# Patient Record
Sex: MALE | Race: WHITE | Employment: FULL TIME | URBAN - METROPOLITAN AREA
[De-identification: names, ages, dates, MRNs, and addresses within clinical notes are randomized per-mention and may not be internally consistent; named-entity substitution may affect disease eponyms.]

---

## 2019-09-13 ENCOUNTER — HOSPITAL ENCOUNTER (EMERGENCY)
Facility: CLINIC | Age: 29
Discharge: HOME | End: 2019-09-13
Attending: STUDENT IN AN ORGANIZED HEALTH CARE EDUCATION/TRAINING PROGRAM | Admitting: STUDENT IN AN ORGANIZED HEALTH CARE EDUCATION/TRAINING PROGRAM
Payer: COMMERCIAL

## 2019-09-13 VITALS
SYSTOLIC BLOOD PRESSURE: 140 MMHG | HEART RATE: 79 BPM | RESPIRATION RATE: 18 BRPM | OXYGEN SATURATION: 100 % | DIASTOLIC BLOOD PRESSURE: 93 MMHG | TEMPERATURE: 98.2 F

## 2019-09-13 DIAGNOSIS — K08.89 PAIN, DENTAL: ICD-10-CM

## 2019-09-13 PROBLEM — F12.20 CANNABIS USE DISORDER, SEVERE, DEPENDENCE (H): Status: ACTIVE | Noted: 2019-06-29

## 2019-09-13 PROBLEM — F11.20: Status: ACTIVE | Noted: 2019-06-26

## 2019-09-13 PROBLEM — F32.5 MAJOR DEPRESSION IN COMPLETE REMISSION (H): Status: ACTIVE | Noted: 2019-06-26

## 2019-09-13 PROCEDURE — 25000132 ZZH RX MED GY IP 250 OP 250 PS 637: Performed by: STUDENT IN AN ORGANIZED HEALTH CARE EDUCATION/TRAINING PROGRAM

## 2019-09-13 PROCEDURE — 99283 EMERGENCY DEPT VISIT LOW MDM: CPT | Performed by: STUDENT IN AN ORGANIZED HEALTH CARE EDUCATION/TRAINING PROGRAM

## 2019-09-13 PROCEDURE — 99284 EMERGENCY DEPT VISIT MOD MDM: CPT | Mod: Z6 | Performed by: STUDENT IN AN ORGANIZED HEALTH CARE EDUCATION/TRAINING PROGRAM

## 2019-09-13 RX ORDER — PENICILLIN V POTASSIUM 250 MG/1
500 TABLET, FILM COATED ORAL ONCE
Status: COMPLETED | OUTPATIENT
Start: 2019-09-13 | End: 2019-09-13

## 2019-09-13 RX ORDER — PENICILLIN V POTASSIUM 500 MG/1
500 TABLET, FILM COATED ORAL 4 TIMES DAILY
Qty: 40 TABLET | Refills: 0 | Status: SHIPPED | OUTPATIENT
Start: 2019-09-13 | End: 2019-09-23

## 2019-09-13 RX ADMIN — PENICILLIN V POTASSIUM 500 MG: 250 TABLET, FILM COATED ORAL at 04:44

## 2019-09-13 NOTE — DISCHARGE INSTRUCTIONS
Many of these clinics offer a sliding fee option for patients that qualify, and see patients on a walk-in or same day basis. Please call each clinic directly. As services, hours, fees and policies vary greatly.          [facility], [facility]  [phone]  Sees no insurance  [facility], [place]  [phone]  Preventive services only  [facility] (mult loc) [phone]  [facility]    ([facility]), [facility]  [phone]  [facility], [place]       [phone]  Preventive services only  [facility]  [phone]  Accepts MA & sees no ins  [facility],      Accepts MA & sees no ins   [place]   [phone]; [phone]  [facility], [facility]   Accepts MA & sees no ins       [phone]  [facility], [facility]  [phone]   Accepts MA emergencies  [facility], [place] [phone]  [facility],     Accepts MA   [place]   [phone]    [facility] [phone]  Accepts MA & sees no ins   [facility]   Dental Clinic    [phone]  [facility], [facility]  [phone]   [facility] [phone]  [facility]  Preventive services only   [place]   [phone]  [facility] [phone]  [facility], [place]  [phone]  Same day [facility] [phone]  Same day [facility] [[email or web address]],      Same day [facility]   [phone]    [facility], [facility] [phone]  Free [facility], walk-in only  [facility] (multiple locations) [phone]      [facility] , [facility] [phone]    [facility] [phone]  Free clinic, walk-in only  [facility]  [phone]  [facility] [phone] (adults)       [phone]  (children)  [facility] [phone]    Also, referral service for low cost dental and healthcare: [phone]  And [phone]

## 2019-09-13 NOTE — ED PROVIDER NOTES
History     Chief Complaint   Patient presents with     Dental Problem     HPI  Denilson Colunga is a 29 year old male who presents for evaluation of left mandibular dental pain and swelling.  Patient states that over the past 24 hours he has felt pain around the mandibular left first premolar which is significantly decayed.  He also feels as though the left side of his mandible is beginning to swell.  He denies palpable abscess, throat swelling, or difficulty swallowing.  He believes he lost saw a dentist 2 years ago, is insured and plans to schedule an appointment later this morning.  He denies fever or other infectious symptoms.  Has been taking OTC ibuprofen as directed with relief from pain, describes as tolerable.    Allergies:  No Known Allergies    Problem List:    Patient Active Problem List    Diagnosis Date Noted     Cannabis use disorder, severe, dependence (H) 06/29/2019     Priority: Medium     Heroin use disorder, severe (H) 06/26/2019     Priority: Medium     Major depression in complete remission (H) 06/26/2019     Priority: Medium     Acne 04/28/2011     Priority: Medium     Generalized anxiety disorder 04/28/2011     Priority: Medium     Patient currently not taking medications . Kyle 7  Is 7 today.       CARDIOVASCULAR SCREENING; LDL GOAL LESS THAN 160 10/31/2010     Priority: Medium        Past Medical History:    Past Medical History:   Diagnosis Date     Anxiety        Past Surgical History:    Past Surgical History:   Procedure Laterality Date     NO HISTORY OF SURGERY         Family History:    Family History   Problem Relation Age of Onset     Depression Mother      Neurologic Disorder Mother         migraine     Thyroid Disease Father      Neurologic Disorder Maternal Grandmother         migraine     Depression Maternal Grandmother      Diabetes Maternal Grandmother      Hypertension Maternal Grandmother      Heart Disease Maternal Grandfather      Heart Disease Paternal Grandmother       Cerebrovascular Disease Paternal Grandmother      Alzheimer Disease Paternal Grandfather      Neurologic Disorder Sister         migraine       Social History:  Marital Status:  Single [1]  Social History     Tobacco Use     Smoking status: Current Every Day Smoker     Packs/day: 0.50     Types: Cigarettes     Last attempt to quit: 2010     Years since quittin.3     Smokeless tobacco: Never Used   Substance Use Topics     Alcohol use: Yes     Comment: socially     Drug use: Yes     Types: Marijuana     Comment: marijuana        Medications:      penicillin V (VEETID) 500 MG tablet   clindamycin-benzoyl Per, Refr, (DUAC) 1.2-5 % GEL   Sulfacetamide Sodium-Sulfur (SULFACETAMIDE SOD-SULFUR WASH) 10-5 % EMUL         Review of Systems  Constitutional:  Negative for fever or chills.  HENT: Positive for left mandibular dental pain.  Respiratory:  Negative for cough or shortness of breath.  Musculoskeletal: Negative for neck pain or stiffness.  Neurological:  Negative for headache.    All others reviewed and are negative.      Physical Exam   BP: (!) 140/93  Pulse: 79  Temp: 98.2  F (36.8  C)  Resp: 18  SpO2: 100 %      Physical Exam  Constitutional:  Well developed, well nourished.  Appears nontoxic and in no acute distress.   HENT:  Normocephalic and atraumatic.  Eyes:  Conjunctivae are normal.  Oral:  Patient is without trismus.  Moist oral mucosa.  Significant dental decay of multiple teeth including left mandibular first premolar which is tender to palpation.  Gingival lining intact without abscess or ulcerative gingivitis.  No pharyngeal erythema or exudate.  No uvular asymmetry.  Patient is able to elevate tongue without sublingual swelling.  Voice is not muffled.  Tolerates secretions.  Neck:  Neck supple.  Cardiovascular:  No cyanosis.   Respiratory:  Effort normal, no respiratory distress.   Musculoskeletal:  Moves extremities spontaneously.  Neurological:  Patient is alert.  Skin:  Skin is warm and  dry.  Psychiatric:  Normal mood and affect.      ED Course        Procedures               Critical Care time:  none               No results found for this or any previous visit (from the past 24 hour(s)).    Medications   penicillin V (VEETID) tablet 500 mg (has no administration in time range)       Assessments & Plan (with Medical Decision Making)   Denilson Colunga is a 29 year old male who presents to the department for evaluation of left mandibular dental pain and early left mandibular swelling.  There is no drainable abscess.  No sign of ANUG, peritonsillar abscess, Ascencion's angina, osteomyelitis, orofacial or peripharyngeal deep space infection.  Suspect early periapical abscess, he has tolerated oral penicillin in the past for her symptoms.  Recommend follow-up with dental clinic over the next 2-3 days for evaluation and definitive management plan.  He has declined analgesic medication stating ibuprofen is sufficient for now.        Disclaimer:  This note consists of symbols derived from keyboarding, dictation, and/or voice recognition software.  As a result, there may be errors in the script that have gone undetected.  Please consider this when interpreting information found in the chart.        I have reviewed the nursing notes.    I have reviewed the findings, diagnosis, plan and need for follow up with the patient.       New Prescriptions    PENICILLIN V (VEETID) 500 MG TABLET    Take 1 tablet (500 mg) by mouth 4 times daily for 10 days       Final diagnoses:   Pain, dental       9/13/2019   [facility] EMERGENCY DEPARTMENT     Lyle Perkins,   09/13/19 0437

## 2019-09-13 NOTE — ED NOTES
Patient here for antibiotics for infected left lower molar infection.  No pain. No facial swelling noted.  Stated can feel infection coming on and wanted to be started on antibiotics.  Feels like last tooth infection.  No other complaints  Started 2 days ago

## 2019-09-13 NOTE — ED AVS SNAPSHOT
[facility] Emergency Department  [address] [address]  Phone:  [phone]  Fax:  [phone]                                    Denilson Colunga   MRN: [id number]    Department:  [facility] Emergency Department   Date of Visit:  9/13/2019           After Visit Summary Signature Page    I have received my discharge instructions, and my questions have been answered. I have discussed any challenges I see with this plan with the nurse or doctor.    ..........................................................................................................................................  Patient/Patient Representative Signature      ..........................................................................................................................................  Patient Representative Print Name and Relationship to Patient    ..................................................               ................................................  Date                                   Time    ..........................................................................................................................................  Reviewed by Signature/Title    ...................................................              ..............................................  Date                                               Time          22EPIC Rev 08/18

## 2020-06-29 ENCOUNTER — HOSPITAL ENCOUNTER (EMERGENCY)
Facility: CLINIC | Age: 30
Discharge: HOME | End: 2020-06-29
Attending: EMERGENCY MEDICINE | Admitting: EMERGENCY MEDICINE
Payer: COMMERCIAL

## 2020-06-29 ENCOUNTER — APPOINTMENT (OUTPATIENT)
Dept: GENERAL RADIOLOGY | Facility: CLINIC | Age: 30
End: 2020-06-29
Attending: EMERGENCY MEDICINE
Payer: COMMERCIAL

## 2020-06-29 VITALS
DIASTOLIC BLOOD PRESSURE: 57 MMHG | HEART RATE: 111 BPM | RESPIRATION RATE: 18 BRPM | TEMPERATURE: 98.6 F | SYSTOLIC BLOOD PRESSURE: 128 MMHG | OXYGEN SATURATION: 99 %

## 2020-06-29 DIAGNOSIS — S60.511A ABRASION OF RIGHT HAND, INITIAL ENCOUNTER: ICD-10-CM
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
## 2020-06-29 DIAGNOSIS — S20.219A CONTUSION OF CHEST WALL, UNSPECIFIED LATERALITY, INITIAL ENCOUNTER: ICD-10-CM

## 2020-06-29 PROCEDURE — 25000128 H RX IP 250 OP 636: Performed by: EMERGENCY MEDICINE

## 2020-06-29 PROCEDURE — 90471 IMMUNIZATION ADMIN: CPT | Performed by: EMERGENCY MEDICINE

## 2020-06-29 PROCEDURE — 90715 TDAP VACCINE 7 YRS/> IM: CPT | Performed by: EMERGENCY MEDICINE

## 2020-06-29 PROCEDURE — 73110 X-RAY EXAM OF WRIST: CPT | Mod: RT

## 2020-06-29 PROCEDURE — 99284 EMERGENCY DEPT VISIT MOD MDM: CPT | Mod: 25 | Performed by: EMERGENCY MEDICINE

## 2020-06-29 PROCEDURE — 99283 EMERGENCY DEPT VISIT LOW MDM: CPT | Mod: Z6 | Performed by: EMERGENCY MEDICINE

## 2020-06-29 PROCEDURE — 71046 X-RAY EXAM CHEST 2 VIEWS: CPT

## 2020-06-29 PROCEDURE — 25000132 ZZH RX MED GY IP 250 OP 250 PS 637: Performed by: EMERGENCY MEDICINE

## 2020-06-29 RX ORDER — IBUPROFEN 400 MG/1
800 TABLET, FILM COATED ORAL ONCE
Status: COMPLETED | OUTPATIENT
Start: 2020-06-29 | End: 2020-06-29

## 2020-06-29 RX ORDER — ACETAMINOPHEN 500 MG
1000 TABLET ORAL ONCE
Status: COMPLETED | OUTPATIENT
Start: 2020-06-29 | End: 2020-06-29

## 2020-06-29 RX ADMIN — ACETAMINOPHEN 1000 MG: 500 TABLET, FILM COATED ORAL at 20:58

## 2020-06-29 RX ADMIN — IBUPROFEN 800 MG: 400 TABLET ORAL at 20:58

## 2020-06-29 RX ADMIN — CLOSTRIDIUM TETANI TOXOID ANTIGEN (FORMALDEHYDE INACTIVATED), CORYNEBACTERIUM DIPHTHERIAE TOXOID ANTIGEN (FORMALDEHYDE INACTIVATED), BORDETELLA PERTUSSIS TOXOID ANTIGEN (GLUTARALDEHYDE INACTIVATED), BORDETELLA PERTUSSIS FILAMENTOUS HEMAGGLUTININ ANTIGEN (FORMALDEHYDE INACTIVATED), BORDETELLA PERTUSSIS PERTACTIN ANTIGEN, AND BORDETELLA PERTUSSIS FIMBRIAE 2/3 ANTIGEN 0.5 ML: 5; 2; 2.5; 5; 3; 5 INJECTION, SUSPENSION INTRAMUSCULAR at 20:58

## 2020-06-29 NOTE — ED AVS SNAPSHOT
[facility] Emergency Department  [address] [address]  Phone:  [phone]  Fax:  [phone]                                    Denilson Colunga   MRN: [id number]    Department:  [facility] Emergency Department   Date of Visit:  6/29/2020           After Visit Summary Signature Page    I have received my discharge instructions, and my questions have been answered. I have discussed any challenges I see with this plan with the nurse or doctor.    ..........................................................................................................................................  Patient/Patient Representative Signature      ..........................................................................................................................................  Patient Representative Print Name and Relationship to Patient    ..................................................               ................................................  Date                                   Time    ..........................................................................................................................................  Reviewed by Signature/Title    ...................................................              ..............................................  Date                                               Time          22EPIC Rev 08/18

## 2020-06-30 NOTE — ED NOTES
Patient attempted to call multiple people to get a ride home.  Writer asked patient several times if we could call his mom to get him a ride home.  Patient stated that his mom has cataracts, a 5 year old at home, would be sleeping, and recently had COVID.  Patient refused to allow writer to call his mother.  Writer asked patient if we could call him a cab and patient stated that he didn't have any money.  Patient discharged and sitting in lobby attempting to call for a ride.
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
## 2020-06-30 NOTE — DISCHARGE INSTRUCTIONS
Tylenol and ibuprofen for discomfort    Ice to swollen painful areas    Return here for worsening chest pain, trouble breathing, passing out or any other concern
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
## 2021-10-19 PROBLEM — F32.9 MAJOR DEPRESSION: Status: ACTIVE | Noted: 2019-06-26

## 2022-05-04 ENCOUNTER — LAB REQUISITION (OUTPATIENT)
Dept: LAB | Facility: CLINIC | Age: 32
End: 2022-05-04
Payer: COMMERCIAL

## 2022-05-04 DIAGNOSIS — R19.7 DIARRHEA, UNSPECIFIED: ICD-10-CM

## 2022-05-04 LAB
ALBUMIN SERPL-MCNC: 4.6 G/DL (ref 3.5–5)
ALP SERPL-CCNC: 79 U/L (ref 45–120)
ALT SERPL W P-5'-P-CCNC: 13 U/L (ref 0–45)
ANION GAP SERPL CALCULATED.3IONS-SCNC: 15 MMOL/L (ref 5–18)
AST SERPL W P-5'-P-CCNC: 16 U/L (ref 0–40)
BILIRUB SERPL-MCNC: 0.4 MG/DL (ref 0–1)
BUN SERPL-MCNC: 15 MG/DL (ref 8–22)
CALCIUM SERPL-MCNC: 9.7 MG/DL (ref 8.5–10.5)
CHLORIDE BLD-SCNC: 102 MMOL/L (ref 98–107)
CO2 SERPL-SCNC: 24 MMOL/L (ref 22–31)
CREAT SERPL-MCNC: 0.81 MG/DL (ref 0.7–1.3)
GFR SERPL CREATININE-BSD FRML MDRD: >90 ML/MIN/1.73M2
GLUCOSE BLD-MCNC: 126 MG/DL (ref 70–125)
POTASSIUM BLD-SCNC: 4.4 MMOL/L (ref 3.5–5)
PROT SERPL-MCNC: 7.1 G/DL (ref 6–8)
SODIUM SERPL-SCNC: 141 MMOL/L (ref 136–145)

## 2022-05-04 PROCEDURE — 80053 COMPREHEN METABOLIC PANEL: CPT | Mod: ORL | Performed by: PHYSICIAN ASSISTANT

## 2022-10-03 ENCOUNTER — TELEPHONE (OUTPATIENT)
Dept: BEHAVIORAL HEALTH | Facility: CLINIC | Age: 32
End: 2022-10-03

## 2022-10-03 NOTE — TELEPHONE ENCOUNTER
Pt is a(n) 32 year old seeking an Eval for ASHLEY/CD  Pt interested in Evaluation and suggestions   Appointment Scheduled by: Mother, Clarisse Prasad

## 2022-10-06 ENCOUNTER — TELEPHONE (OUTPATIENT)
Dept: BEHAVIORAL HEALTH | Facility: CLINIC | Age: 32
End: 2022-10-06

## 2022-10-06 NOTE — TELEPHONE ENCOUNTER
Patient have a video appointment today at 10:30am. Writer placed a call this morning to check in patient. Unable to get a hold of patient. Writer left a voicemail with writer's call back number.

## 2023-06-23 ENCOUNTER — HOSPITAL ENCOUNTER (OUTPATIENT)
Facility: CLINIC | Age: 33
Setting detail: OBSERVATION
Discharge: NURSING, REHAB OR LONG-TERM CARE | End: 2023-06-27
Attending: EMERGENCY MEDICINE | Admitting: EMERGENCY MEDICINE

## 2023-06-23 DIAGNOSIS — F14.90 COCAINE USE: ICD-10-CM

## 2023-06-23 DIAGNOSIS — F13.90 SEDATIVE, HYPNOTIC, OR ANXIOLYTIC USE, UNSPECIFIED, UNCOMPLICATED: ICD-10-CM

## 2023-06-23 DIAGNOSIS — F10.90 ALCOHOL USE, UNSPECIFIED, UNCOMPLICATED: Primary | ICD-10-CM

## 2023-06-23 DIAGNOSIS — F33.9 EPISODE OF RECURRENT MAJOR DEPRESSIVE DISORDER, UNSPECIFIED DEPRESSION EPISODE SEVERITY (H): ICD-10-CM

## 2023-06-23 DIAGNOSIS — F41.1 GAD (GENERALIZED ANXIETY DISORDER): ICD-10-CM

## 2023-06-23 LAB
ALBUMIN SERPL BCG-MCNC: 4.3 G/DL (ref 3.5–5.2)
ALP SERPL-CCNC: 133 U/L (ref 40–129)
ALT SERPL W P-5'-P-CCNC: 48 U/L (ref 0–70)
AMPHETAMINES UR QL SCN: ABNORMAL
ANION GAP SERPL CALCULATED.3IONS-SCNC: 12 MMOL/L (ref 7–15)
AST SERPL W P-5'-P-CCNC: 49 U/L (ref 0–45)
BARBITURATES UR QL SCN: ABNORMAL
BASOPHILS # BLD AUTO: 0.1 10E3/UL (ref 0–0.2)
BASOPHILS NFR BLD AUTO: 1 %
BENZODIAZ UR QL SCN: ABNORMAL
BILIRUB SERPL-MCNC: 0.4 MG/DL
BUN SERPL-MCNC: 25.4 MG/DL (ref 6–20)
BZE UR QL SCN: ABNORMAL
CALCIUM SERPL-MCNC: 8.8 MG/DL (ref 8.6–10)
CANNABINOIDS UR QL SCN: ABNORMAL
CHLORIDE SERPL-SCNC: 99 MMOL/L (ref 98–107)
CREAT SERPL-MCNC: 1.13 MG/DL (ref 0.67–1.17)
DEPRECATED HCO3 PLAS-SCNC: 24 MMOL/L (ref 22–29)
EOSINOPHIL # BLD AUTO: 0.4 10E3/UL (ref 0–0.7)
EOSINOPHIL NFR BLD AUTO: 6 %
ERYTHROCYTE [DISTWIDTH] IN BLOOD BY AUTOMATED COUNT: 13.2 % (ref 10–15)
GFR SERPL CREATININE-BSD FRML MDRD: 89 ML/MIN/1.73M2
GLUCOSE SERPL-MCNC: 103 MG/DL (ref 70–99)
HCT VFR BLD AUTO: 36.9 % (ref 40–53)
HGB BLD-MCNC: 12.3 G/DL (ref 13.3–17.7)
IMM GRANULOCYTES # BLD: 0 10E3/UL
IMM GRANULOCYTES NFR BLD: 1 %
LYMPHOCYTES # BLD AUTO: 1.6 10E3/UL (ref 0.8–5.3)
LYMPHOCYTES NFR BLD AUTO: 23 %
MCH RBC QN AUTO: 28.5 PG (ref 26.5–33)
MCHC RBC AUTO-ENTMCNC: 33.3 G/DL (ref 31.5–36.5)
MCV RBC AUTO: 85 FL (ref 78–100)
MONOCYTES # BLD AUTO: 0.8 10E3/UL (ref 0–1.3)
MONOCYTES NFR BLD AUTO: 11 %
NEUTROPHILS # BLD AUTO: 4 10E3/UL (ref 1.6–8.3)
NEUTROPHILS NFR BLD AUTO: 58 %
NRBC # BLD AUTO: 0 10E3/UL
NRBC BLD AUTO-RTO: 0 /100
OPIATES UR QL SCN: ABNORMAL
PLATELET # BLD AUTO: 347 10E3/UL (ref 150–450)
POTASSIUM SERPL-SCNC: 3.9 MMOL/L (ref 3.4–5.3)
PROT SERPL-MCNC: 7 G/DL (ref 6.4–8.3)
RBC # BLD AUTO: 4.32 10E6/UL (ref 4.4–5.9)
SODIUM SERPL-SCNC: 135 MMOL/L (ref 136–145)
WBC # BLD AUTO: 6.9 10E3/UL (ref 4–11)

## 2023-06-23 PROCEDURE — G0378 HOSPITAL OBSERVATION PER HR: HCPCS

## 2023-06-23 PROCEDURE — 90791 PSYCH DIAGNOSTIC EVALUATION: CPT

## 2023-06-23 PROCEDURE — 36415 COLL VENOUS BLD VENIPUNCTURE: CPT | Performed by: EMERGENCY MEDICINE

## 2023-06-23 PROCEDURE — 80053 COMPREHEN METABOLIC PANEL: CPT | Performed by: EMERGENCY MEDICINE

## 2023-06-23 PROCEDURE — 250N000013 HC RX MED GY IP 250 OP 250 PS 637: Performed by: EMERGENCY MEDICINE

## 2023-06-23 PROCEDURE — 99285 EMERGENCY DEPT VISIT HI MDM: CPT | Mod: 25 | Performed by: EMERGENCY MEDICINE

## 2023-06-23 PROCEDURE — 99223 1ST HOSP IP/OBS HIGH 75: CPT | Performed by: EMERGENCY MEDICINE

## 2023-06-23 PROCEDURE — 80307 DRUG TEST PRSMV CHEM ANLYZR: CPT | Performed by: EMERGENCY MEDICINE

## 2023-06-23 PROCEDURE — 85025 COMPLETE CBC W/AUTO DIFF WBC: CPT | Performed by: EMERGENCY MEDICINE

## 2023-06-23 RX ORDER — BUPRENORPHINE AND NALOXONE 8; 2 MG/1; MG/1
1 FILM, SOLUBLE BUCCAL; SUBLINGUAL 2 TIMES DAILY
Status: DISCONTINUED | OUTPATIENT
Start: 2023-06-23 | End: 2023-06-24

## 2023-06-23 RX ORDER — BUPRENORPHINE AND NALOXONE 4; 1 MG/1; MG/1
1 FILM, SOLUBLE BUCCAL; SUBLINGUAL DAILY
Status: DISCONTINUED | OUTPATIENT
Start: 2023-06-24 | End: 2023-06-24

## 2023-06-23 RX ORDER — OLANZAPINE 5 MG/1
5 TABLET, ORALLY DISINTEGRATING ORAL ONCE
Status: COMPLETED | OUTPATIENT
Start: 2023-06-23 | End: 2023-06-23

## 2023-06-23 RX ORDER — IBUPROFEN 600 MG/1
600 TABLET, FILM COATED ORAL ONCE
Status: COMPLETED | OUTPATIENT
Start: 2023-06-23 | End: 2023-06-23

## 2023-06-23 RX ADMIN — OLANZAPINE 5 MG: 5 TABLET, ORALLY DISINTEGRATING ORAL at 19:35

## 2023-06-23 RX ADMIN — IBUPROFEN 600 MG: 600 TABLET, FILM COATED ORAL at 20:50

## 2023-06-23 ASSESSMENT — COLUMBIA-SUICIDE SEVERITY RATING SCALE - C-SSRS
4. HAVE YOU HAD THESE THOUGHTS AND HAD SOME INTENTION OF ACTING ON THEM?: NO
6. HAVE YOU EVER DONE ANYTHING, STARTED TO DO ANYTHING, OR PREPARED TO DO ANYTHING TO END YOUR LIFE?: NO
3. HAVE YOU BEEN THINKING ABOUT HOW YOU MIGHT KILL YOURSELF?: NO
2. HAVE YOU ACTUALLY HAD ANY THOUGHTS OF KILLING YOURSELF?: YES
5. HAVE YOU STARTED TO WORK OUT OR WORKED OUT THE DETAILS OF HOW TO KILL YOURSELF? DO YOU INTEND TO CARRY OUT THIS PLAN?: NO
REASONS FOR IDEATION LIFETIME: MOSTLY TO END OR STOP THE PAIN (YOU COULDN'T GO ON LIVING WITH THE PAIN OR HOW YOU WERE FEELING)
5. HAVE YOU STARTED TO WORK OUT OR WORKED OUT THE DETAILS OF HOW TO KILL YOURSELF? DO YOU INTEND TO CARRY OUT THIS PLAN?: NO
ATTEMPT LIFETIME: NO
4. HAVE YOU HAD THESE THOUGHTS AND HAD SOME INTENTION OF ACTING ON THEM?: NO
TOTAL  NUMBER OF ABORTED OR SELF INTERRUPTED ATTEMPTS LIFETIME: NO
TOTAL  NUMBER OF INTERRUPTED ATTEMPTS LIFETIME: NO
1. HAVE YOU WISHED YOU WERE DEAD OR WISHED YOU COULD GO TO SLEEP AND NOT WAKE UP?: YES
REASONS FOR IDEATION PAST MONTH: MOSTLY TO END OR STOP THE PAIN (YOU COULDN'T GO ON LIVING WITH THE PAIN OR HOW YOU WERE FEELING)
2. HAVE YOU ACTUALLY HAD ANY THOUGHTS OF KILLING YOURSELF?: YES
1. IN THE PAST MONTH, HAVE YOU WISHED YOU WERE DEAD OR WISHED YOU COULD GO TO SLEEP AND NOT WAKE UP?: YES

## 2023-06-23 ASSESSMENT — ACTIVITIES OF DAILY LIVING (ADL)
ADLS_ACUITY_SCORE: 35
ADLS_ACUITY_SCORE: 33
ADLS_ACUITY_SCORE: 35

## 2023-06-23 NOTE — ED TRIAGE NOTES
Triage Assessment     Row Name 06/23/23 7402       Triage Assessment (Adult)    Airway WDL WDL       Respiratory WDL    Respiratory WDL WDL       Skin Circulation/Temperature WDL    Skin Circulation/Temperature WDL X  abrasions to bilateral knees       Cardiac WDL    Cardiac WDL WDL       Peripheral/Neurovascular WDL    Peripheral Neurovascular WDL WDL       Cognitive/Neuro/Behavioral WDL    Cognitive/Neuro/Behavioral WDL WDL

## 2023-06-24 PROCEDURE — 99232 SBSQ HOSP IP/OBS MODERATE 35: CPT | Performed by: EMERGENCY MEDICINE

## 2023-06-24 PROCEDURE — 250N000013 HC RX MED GY IP 250 OP 250 PS 637: Performed by: EMERGENCY MEDICINE

## 2023-06-24 PROCEDURE — G0378 HOSPITAL OBSERVATION PER HR: HCPCS

## 2023-06-24 RX ORDER — MIRTAZAPINE 15 MG/1
15 TABLET, FILM COATED ORAL AT BEDTIME
Status: ON HOLD | COMMUNITY
End: 2023-11-04

## 2023-06-24 RX ORDER — VENLAFAXINE HYDROCHLORIDE 150 MG/1
300 CAPSULE, EXTENDED RELEASE ORAL DAILY
Status: DISCONTINUED | OUTPATIENT
Start: 2023-06-24 | End: 2023-06-27 | Stop reason: HOSPADM

## 2023-06-24 RX ORDER — BUPRENORPHINE AND NALOXONE 4; 1 MG/1; MG/1
1 FILM, SOLUBLE BUCCAL; SUBLINGUAL DAILY
Status: DISCONTINUED | OUTPATIENT
Start: 2023-06-24 | End: 2023-06-24 | Stop reason: DRUGHIGH

## 2023-06-24 RX ORDER — GABAPENTIN 600 MG/1
600 TABLET ORAL 3 TIMES DAILY
COMMUNITY
End: 2023-11-02

## 2023-06-24 RX ORDER — VENLAFAXINE HYDROCHLORIDE 150 MG/1
2 CAPSULE, EXTENDED RELEASE ORAL DAILY
COMMUNITY

## 2023-06-24 RX ORDER — MIRTAZAPINE 15 MG/1
15 TABLET, FILM COATED ORAL AT BEDTIME
Status: DISCONTINUED | OUTPATIENT
Start: 2023-06-24 | End: 2023-06-27 | Stop reason: HOSPADM

## 2023-06-24 RX ORDER — BUSPIRONE HYDROCHLORIDE 15 MG/1
15 TABLET ORAL 2 TIMES DAILY
Status: DISCONTINUED | OUTPATIENT
Start: 2023-06-24 | End: 2023-06-27 | Stop reason: HOSPADM

## 2023-06-24 RX ORDER — BUSPIRONE HYDROCHLORIDE 30 MG/1
1 TABLET ORAL 2 TIMES DAILY
COMMUNITY
End: 2023-12-08

## 2023-06-24 RX ORDER — BUPRENORPHINE AND NALOXONE 8; 2 MG/1; MG/1
1 FILM, SOLUBLE BUCCAL; SUBLINGUAL DAILY
Status: DISCONTINUED | OUTPATIENT
Start: 2023-06-25 | End: 2023-06-27 | Stop reason: HOSPADM

## 2023-06-24 RX ORDER — HYDROXYZINE HYDROCHLORIDE 25 MG/1
50 TABLET, FILM COATED ORAL 3 TIMES DAILY PRN
COMMUNITY
End: 2023-11-02

## 2023-06-24 RX ORDER — BUPRENORPHINE AND NALOXONE 4; 1 MG/1; MG/1
1 FILM, SOLUBLE BUCCAL; SUBLINGUAL 2 TIMES DAILY
Status: DISCONTINUED | OUTPATIENT
Start: 2023-06-24 | End: 2023-06-27 | Stop reason: HOSPADM

## 2023-06-24 RX ORDER — BUPRENORPHINE AND NALOXONE 8; 2 MG/1; MG/1
FILM, SOLUBLE BUCCAL; SUBLINGUAL
COMMUNITY

## 2023-06-24 RX ORDER — IBUPROFEN 200 MG
400 TABLET ORAL EVERY 6 HOURS PRN
Status: DISCONTINUED | OUTPATIENT
Start: 2023-06-24 | End: 2023-06-27 | Stop reason: HOSPADM

## 2023-06-24 RX ORDER — IBUPROFEN 400 MG/1
400 TABLET, FILM COATED ORAL EVERY 6 HOURS PRN
COMMUNITY
End: 2023-11-02

## 2023-06-24 RX ORDER — HYDROXYZINE HYDROCHLORIDE 50 MG/1
50 TABLET, FILM COATED ORAL 3 TIMES DAILY PRN
Status: DISCONTINUED | OUTPATIENT
Start: 2023-06-24 | End: 2023-06-27 | Stop reason: HOSPADM

## 2023-06-24 RX ORDER — IBUPROFEN 600 MG/1
600 TABLET, FILM COATED ORAL ONCE
Status: COMPLETED | OUTPATIENT
Start: 2023-06-24 | End: 2023-06-24

## 2023-06-24 RX ADMIN — IBUPROFEN 600 MG: 600 TABLET, FILM COATED ORAL at 14:36

## 2023-06-24 RX ADMIN — BUPRENORPHINE AND NALOXONE 1 FILM: 4; 1 FILM, SOLUBLE BUCCAL; SUBLINGUAL at 13:58

## 2023-06-24 RX ADMIN — MIRTAZAPINE 15 MG: 15 TABLET, FILM COATED ORAL at 22:10

## 2023-06-24 RX ADMIN — IBUPROFEN 400 MG: 200 TABLET, FILM COATED ORAL at 22:09

## 2023-06-24 RX ADMIN — VENLAFAXINE HYDROCHLORIDE 300 MG: 150 CAPSULE, EXTENDED RELEASE ORAL at 18:52

## 2023-06-24 RX ADMIN — BUPRENORPHINE AND NALOXONE 1 FILM: 4; 1 FILM, SOLUBLE BUCCAL; SUBLINGUAL at 22:09

## 2023-06-24 RX ADMIN — BUSPIRONE HYDROCHLORIDE 15 MG: 15 TABLET ORAL at 20:30

## 2023-06-24 RX ADMIN — NICOTINE POLACRILEX 4 MG: 4 GUM, CHEWING BUCCAL at 20:13

## 2023-06-24 RX ADMIN — HYDROXYZINE HYDROCHLORIDE 50 MG: 25 TABLET, FILM COATED ORAL at 22:10

## 2023-06-24 RX ADMIN — BUPRENORPHINE AND NALOXONE 1 FILM: 8; 2 FILM, SOLUBLE BUCCAL; SUBLINGUAL at 00:06

## 2023-06-24 ASSESSMENT — ACTIVITIES OF DAILY LIVING (ADL)
ADLS_ACUITY_SCORE: 35

## 2023-06-24 NOTE — ED NOTES
States he had an episode of psychosis over the past 2-3 days, feels better today but is having intrusive suicidal thoughts, denies having a plan

## 2023-06-24 NOTE — ED NOTES
Patient came from ED to [facility]  at 0130 with assistant of a staff.VSS.Patient is calm and cooperative  for assessment .Orientation to the floor was given .No behaviors or any distress noted.Will continue to monitor,

## 2023-06-24 NOTE — PROGRESS NOTES
"Triage & Transition Services, Extended Care     Therapy Progress Note    Patient: Denilson goes by \"Denilson,\" uses he/him pronouns  Date of Service: June 24, 2023  Site of Service: [facility]  Patient was seen in-person.     Presenting problem:   Denilson is followed related to observation status. Please see initial [facility] Crisis Assessment completed by Astrid Dalton on 6/23/2023 for complete assessment information. Notable concerns include SI, seeking detox.     Individuals Present: Denilson & Guerline Edouard [facility]    Session start: 1236  Session end: 1255  Session duration in minutes: 19  Session number: 1  Anticipated number of sessions or this episode of care: 1-4  CPT utilized: 11112 - Psychotherapy (with patient) - 30 (16-37*) min    Current Presentation:   Patient is awake, eating lunch in his room.  Is agreeable to meeting with this writer in person.  Patient reports he has been residing at [facility] for several months, reports  relapsing over the last 4 to 5 days reports abusing cocaine, benzodiazepine, alcohol.  States when he first relapsed he returned to his sober house and was kicked out, reports he continued to use for an additional 4 days prior to coming in.  Patient has history of multiple [facility] treatment programs, states he recently completed [facility] and a treatment programming through [facility] prior to entering [facility].  Reports depressed mood, anxiety.  Patient continues to endorse visual hallucinations, reports seeing trailing behind objects.  Patient does present with self-loathing, increased anxiety, depressed mood.  Reports he has not slept for approximately 2 or 3 days prior to coming to the hospital.  Has been sleeping most of the day today.  Patient would like to return to [facility], reports program indicated they would work with him on his return.   and sobriety  are not available on the weekend, patient will continue to board at this time until alternative shelter " options are explored or Monday morning when [facility] can provide further direction.     Mental Status Exam:   Appearance: awake, alert, dressed in hospital scrubs and disheveled   Attitude: cooperative  Eye Contact: good  Mood: anxious, sad  and depressed  Affect: mood congruent  Speech: clear, coherent  Psychomotor Behavior: no evidence of tardive dyskinesia, dystonia, or tics  Thought Process:  goal oriented  Associations: no loose associations  Thought Content: passive suicidal ideation present and visual hallucinations present  Insight: fair  Judgement: fair  Oriented to: time, person, and place  Attention Span and Concentration: intact  Recent and Remote Memory: fair    Diagnosis:   Major depressive disorder, Recurrent episode, Unspecified F33.9 - primary, by history                                        Generalized anxiety disorder  F41.1 - by history       Therapeutic Intervention(s):   Provided active listening, unconditional positive regard, and validation. Engaged in safety planning.  Reviewed healthy living that supports positive mental health, including looking at sleep hygiene, regular movement, nutrition, and regular socialization. Provided positive reinforcement for progress towards goals, gains in knowledge, and application of skills previously taught.  Identified stress relief practices.    Treatment Objective(s) Addressed:   The focus of this session was on rapport building, identifying and practicing coping strategies, safety planning, assessing safety, building self-esteem and identifying additional supports .     Progress Towards Goals:   Patient reports stable symptoms. Patient continues to clear from substance abuse, is reporting depressed mood, anxiety.     Case Management:   Spoke with mother Susan, she reports sober house was willing to consider having patient return to programming but was unable to meet until Monday.  Reports [facility] had concerns that patient's mental  health was not being adequately addressed.  Mother is concerned patient continues to struggle with staying sober, has had multiple [facility] treatment interventions, continues to relapse, experiences depressed mood, suicidal ideation, self-loathing.  Reports patient can become angry\aggressive when under the influence, will often become oppositional and push back on others and expectations.  Appears to be having cognitive distortions at times.  Patient recently has accessed medical assistance and benefits through [facility].  Parents are feeling overwhelmed by attempting to address patient's case management needs, help him find appropriate housing.  Patient will benefit from referral for [facility] case management, also additional programming including possible relapse program or return to [facility].    General Recommendations:   Continue to monitor for harm. Consider: Use a positive, direct and calm approach. Pt's tend to match the energy/mood of the staff. Keep focus positive and upbeat, Use clear and concise directions, too many words can be overwhelming and Provide the pt with options to provide a sense of control. Try to tell the pt what they can do instead of what they can't do    Plan:   Observation: A lower level of care has been unsuccessful in treating and stabilizing patient s mental health symptoms because of relapse. However, with brief observation, monitored therapeutic treatment, and intervention of mental health symptoms in the [facility], symptoms may be mitigated with potential for disposition to a less restrictive level of care than an inpatient setting. Patient is not currently on the inpatient worklist. Next steps in care include ensuring pt is getting prescribed medications, pt further clearing of substances, monitoring pt's mood.  Extended Care will follow, working to address return to sober living, establishing outpt therapy resources, engaging pt in aftercare planning.       Plan for Care reviewed with Assigned  Medical Provider? Yes. Provider, Dr Baron, response: supports plan     Guerline Edouard, [facility]   Licensed Mental Health Professional (LMHP), [facility]  [phone]

## 2023-06-24 NOTE — PROGRESS NOTES
"The following information was received from Clarisse Colunga whose relationship to the patient is mother. Information was obtained via phone. Her phone number is [phone] and she last had contact with patient on 23.    Pt has depression, anxiety, and addiction. He has undiagnosed autism. He gets overwhelmed easily and does not know what to do with his feelings, causing his emotions to get bottled up. He is not functioning well. He is lonely, has lost his will to live, has no hope, and lacks motivation. He had been sober for three months but relapsed on cocaine, fentanyl, and a \"research chemical benzo\" two days ago. He was kicked out of his sober home yesterday as a result. This morning pt was distraught and said, \"I'm done and you can start planning my \". He has not disclosed a specific plan for suicide. No concerns for HI. Pt's mother is a support for him and is interested in him being formally tested for autism.    Kalee Gudino, LGSW           "

## 2023-06-24 NOTE — ED NOTES
Patient reports some anxiety due to hospitalization. He denies SI HI and hallucinations. Denies any concerns at this time. He is wanting to go back to the treatment center.

## 2023-06-24 NOTE — PLAN OF CARE
Denilson Keanu  June 23, 2023  Plan of Care Hand-off Note     Patient Care Path: Discharge    Plan for Care:     After therapeutic assessment, intervention and aftercare planning by ED care team and LM and in consultation with attending provider, the patient's circumstances and mental state were appropriate for him to return to his sober house and continue following up with outpatient management. It is the recommendation of this clinician that pt discharge with OP MH support. At this time the pt is not presenting as an acute risk to self or others due to the following factors: pt endorses passive SI, but denies any plan or intent. Denies HI, NSSI, hallucinations or delusions. Pt endorses recent relapse of alcohol, cocaine and benzos daily since Sunday. Pt was assessed by MD and determined detox was not medically necessary at the time of assessment. Pt reports his sober house will likely accept him back in the morning, but no one at the home is answering at this time. Pt has established psychiatry and a sober  he will continue to follow up with. He is agreeable to returning to [facility] treatment if his sober home requires him to. Plan will be for patient to return to his sober home in the morning once he is able to reach staff who can confirm they are ready to accept him back.     Critical Safety Issues: SI (no plan or intent)     Overview:  This patient is a child/adolescent: No    This patient has additional special visitor precautions: No    Legal Status: Voluntary    Contacts:     Clarisse Colunga (mother) [phone]    Psychiatry Consult:  Psychiatry Consult not requested because plan is for pt to discharge in the AM    Updated RN and Attending Provider regarding plan of care.    MANAS Gonzales

## 2023-06-24 NOTE — PHARMACY-ADMISSION MEDICATION HISTORY
Admission Medication History Completed by Pharmacy    See [facility] Admission Navigator for allergy information, preferred outpatient pharmacy, prior to admission medications and immunization status.     Medication history sources:  Patient; [facility] dispense report; outpatient psychiatry note from 6/15/23     Pertinent changes made to PTA medication list:  Added: All PTA medications were added to list   Deleted: N/A  Changed: N/A     Additional medication history information:   - Patient denies taking any additional Rx/OTC medications other than the ones listed below.    Prior to Admission medications    Medication Sig Last Dose Taking? Auth Provider Long Term End Date   buprenorphine HCl-naloxone HCl (SUBOXONE) 8-2 MG per film Take 1 film (8-2 mg) sublingually in the morning, then take 1/2 film (4-1 mg) sublingually in the afternoon and at bedtime. 6/23/2023 Yes Unknown, Entered By History     busPIRone HCl (BUSPAR) 30 MG tablet Take 15 mg by mouth 2 times daily 6/23/2023 Yes Unknown, Entered By History     gabapentin (NEURONTIN) 600 MG tablet Take 600 mg by mouth 3 times daily Past Week Yes Unknown, Entered By History Yes    hydrOXYzine (ATARAX) 25 MG tablet Take 50 mg by mouth 3 times daily as needed for anxiety Past Week Yes Unknown, Entered By History     ibuprofen (ADVIL/MOTRIN) 400 MG tablet Take 400 mg by mouth every 6 hours as needed for moderate pain or fever 6/23/2023 Yes Unknown, Entered By History     mirtazapine (REMERON) 15 MG tablet Take 15 mg by mouth At Bedtime Past Week Yes Unknown, Entered By History Yes    venlafaxine (EFFEXOR XR) 150 MG 24 hr capsule Take 300 mg by mouth daily 6/23/2023 Yes Unknown, Entered By History Yes           Date completed: 06/24/23    Medication history completed by:   Deysi Santiago, PharmD   *[id number]

## 2023-06-24 NOTE — CONSULTS
Diagnostic Evaluation Consultation  Crisis Assessment    Patient was assessed: In Person  Patient location: declocations: [facility] Adult Emergency Department  Was a release of information signed: No. Reason: pt declined      Referral Data and Chief Complaint  Denilson is a 32 year old, who uses he/him pronouns, and presents to the ED alone. Patient is referred to the ED by other: sober living residence and self. Patient is presenting to the ED for the following concerns: suicidal ideation and seeking detox    Informed Consent and Assessment Methods     Patient is his own guardian. Writer met with patient and explained the crisis assessment process, including applicable information disclosures and limits to confidentiality, assessed understanding of the process, and obtained consent to proceed with the assessment. Patient was observed to be able to participate in the assessment as evidenced by being alert, oriented and engaged. Assessment methods included conducting a formal interview with patient, review of medical records, collaboration with medical staff, and obtaining relevant collateral information from family and community providers when available.    Over the course of this crisis assessment provided reassurance, offered validation, engaged patient in problem solving and disposition planning and worked with patient on safety and aftercare planning. Patient's response to interventions was calm and cooperative.      Summary of Patient Situation     Patient presents to [facility] ED alone from his sober home for evaluation of suicidal ideation and seeking detox. Pt has a hx of heroin use disorder, opiate abuse, polysubstance abuse, SAVANA and  MDD. Pt reports he has recently relapsed and been on a mills of concaine, benzos and alcohol since Sunday. He estimates he has been drinking approximately a 12 pack of beer per night and 100 mg of benzos. He is not sure how much cocaine he has been using, but reports he hasn't  used that since Wednesday. Last use of cocaine and alcohol was yesterday. Pt reports concern for withdrawals and reports his sober home wanted him evaluated prior to being able to return to the house. He reports he and his mother went to the home today and staff agreed to work with him and accept him back if he was evaluated at the hospital. Pt reports he has been having passive suicidal thoughts, no plan or intent, following this relapse. Reports it has been hard to adjust to the sober life and not instantly run to drugs as a means to cope. He also reports his father is diagnosed with cancer, which is worsening, and that has been hard for him and his family lately. Pt denies HI, NSSI, hallucinations or delusions.     Brief Psychosocial History     Pt currently resides in a sober home. He is not currently working, but reports he is looking for a job. He feels supported by his family, friends, sober community, sober  and girlfriend. Pt reports he enjoys anything outdoors including fishing, hunting and going for walks and spending time outside is helpful for his mental health. No relevant legal issues noted or reported. No cultural, Gnosticist or spiritual influences on MH care noted or reported.     Significant Clinical History     Pt has a hx of heroin use disorder, opiate abuse, polysubstance abuse, SAVANA and  MDD. There does not appear to be a hx of IP MH. No hx of commitment. Hx of CD treatment, patient reports he recently completed IOP 2 months ago and is currently residing in a sober house. He has a sober  and psychiatrist. Reports he will be starting individual therapy soon. He is open to returning to CD treatment if his sober home asks him to. He reports he has been compliant with his medication. Does not report any relevant trauma hx at the time of assessment.      Collateral Information    The following information was received from Clarisse Colunga whose relationship to the patient is mother.  "Information was obtained via phone. Her phone number is [phone] and she last had contact with patient on 23.     Pt has depression, anxiety, and addiction. He has undiagnosed autism. He gets overwhelmed easily and does not know what to do with his feelings, causing his emotions to get bottled up. He is not functioning well. He is lonely, has lost his will to live, has no hope, and lacks motivation. He had been sober for three months but relapsed on cocaine, fentanyl, and a \"research chemical benzo\" two days ago. He was kicked out of his sober home yesterday as a result. This morning pt was distraught and said, \"I'm done and you can start planning my \". He has not disclosed a specific plan for suicide. No concerns for HI. Pt's mother is a support for him and is interested in him being formally tested for autism.     Risk Assessment    [place] Suicide Severity Rating Scale Full Clinical Version: low risk 23  Suicidal Ideation  1. Wish to be Dead (Lifetime): Yes  1. Wish to be Dead (Past 1 Month): Yes  2. Non-Specific Active Suicidal Thoughts (Lifetime): Yes  2. Non-Specific Active Suicidal Thoughts (Past 1 Month): Yes  3. Active Suicidal Ideation with any Methods (Not Plan) Without Intent to Act (Lifetime): No  3. Active Suicidal Ideation with any Methods (Not Plan) Without Intent to Act (Past 1 Month): No  4. Active Suicidal Ideation with Some Intent to Act, Without Specific Plan (Lifetime): No  4. Active Suicidal Ideation with Some Intent to Act, Without Specific Plan (Past 1 Month): No  5. Active Suicidal Ideation with Specific Plan and Intent (Lifetime): No  5. Active Suicidal Ideation with Specific Plan and Intent (Past 1 Month): No  Intensity of Ideation  Most Severe Ideation Rating (Lifetime): 2  Most Severe Ideation Rating (Past 1 Month): 2  Frequency (Lifetime): 2-5 times in week  Frequency (Past 1 Month): 2-5 times in week  Duration (Lifetime): Less than 1 hour/some of the " time  Duration (Past 1 Month): Less than 1 hour/some of the time  Controllability (Lifetime): Can control thoughts with little difficulty  Controllability (Past 1 Month): Can control thoughts with little difficulty  Deterrents (Lifetime): Deterrents definitely stopped you from attempting suicide  Deterrents (Past 1 Month): Deterrents definitely stopped you from attempting suicide  Reasons for Ideation (Lifetime): Mostly to end or stop the pain (You couldn't go on living with the pain or how you were feeling)  Reasons for Ideation (Past 1 Month): Mostly to end or stop the pain (You couldn't go on living with the pain or how you were feeling)  Suicidal Behavior  Actual Attempt (Lifetime): No  Has subject engaged in non-suicidal self-injurious behavior? (Lifetime): No  Interrupted Attempts (Lifetime): No  Aborted or Self-Interrupted Attempt (Lifetime): No  Preparatory Acts or Behavior (Lifetime): No  C-SSRS Risk (Lifetime/Recent)  Calculated C-SSRS Risk Score (Lifetime/Recent): Low Risk    Validity of evaluation is impacted by presenting factors during interview.   Environmental or Psychosocial Events: helplessness/hopelessness, unemployment/underemployment, other life stressors and ongoing abuse of substances  Chronic Risk Factors: none   Warning Signs: talking or writing about death, dying, or suicide, increasing substance use or abuse and anxiety, agitation, unable to sleep, sleeping all the time  Protective Factors: strong bond to family unit, community support, or employment, intact marriage or domestic partnership, lives in a responsibly safe and stable environment, supportive ongoing medical and mental health care relationships, help seeking and constructive use of leisure time, enjoyable activities, resilience  Interpretation of Risk Scoring, Risk Mitigation Interventions and Safety Plan:  Pt is assessed to be of low risk of harm to himself based on the [place] screening. Pt endorses passive SI, but denies any  plan or intent at the time of assessment. Denies any hx of prior suicide attempts. Denies NSSI. Pt is able to engage in safety planning.      Does the patient have thoughts of harming others? No     Is the patient engaging in sexually inappropriate behavior?  no        Current Substance Abuse     Is there recent substance abuse? Yes. Pt reports he has been drinking a 12 pack of beer daily since Sunday and estimates he has been using about 100 mg of benzos per night. Reports he has also been using cocaine, most recently on Wednesday, but is not sure how much. Benzos and alcohol last night.      Was a urine drug screen or blood alcohol level obtained: Yes positive for benzos and cocaine       Mental Status Exam     Affect: Appropriate   Appearance: Appropriate    Attention Span/Concentration: Attentive  Eye Contact: Engaged   Fund of Knowledge: Appropriate    Language /Speech Content: Fluent   Language /Speech Volume: Normal    Language /Speech Rate/Productions: Normal    Recent Memory: Intact   Remote Memory: Intact   Mood: Anxious and Sad    Orientation to Person: Yes    Orientation to Place: Yes   Orientation to Time of Day: Yes    Orientation to Date: Yes    Situation (Do they understand why they are here?): Yes    Psychomotor Behavior: Normal    Thought Content: Clear   Thought Form: Intact      History of commitment: No    Medication    Psychotropic medications: Yes. Pt is currently taking effexor 300 mg, buspar 30 mg 2x daily, gabapentin 600 mg 3 x daily, mirtazapine 15 mg PM, suboxone 8 mg AM and PM, hydroxyzine 25 mg 3 x daily as needed. Medication compliant: Yes. Recent medication changes: No     Current Care Team    Primary Care Provider: No  Psychiatrist: Dr. Marco Ford, [facility] [address] [place] [address]    Phone: [phone]    Fax: [phone]    Therapist: No  : No     CTSS or ARMHS: No  ACT Team: No  Other: Has a sober  and lives in sober  housing      Diagnosis    1 Major depressive disorder, Recurrent episode, Unspecified F33.9 - primary, by history         2 Generalized anxiety disorder F41.1 - by history    Clinical Summary and Substantiation of Recommendations    After therapeutic assessment, intervention and aftercare planning by ED care team and [facility] and in consultation with attending provider, the patient's circumstances and mental state were appropriate for him to return to his sober house and continue following up with outpatient management. It is the recommendation of this clinician that pt discharge with OP MH support. At this time the pt is not presenting as an acute risk to self or others due to the following factors: pt endorses passive SI, but denies any plan or intent. Denies HI, NSSI, hallucinations or delusions. Pt endorses recent relapse of alcohol, cocaine and benzos daily since Sunday. Pt was assessed by MD and determined detox was not medically necessary at the time of assessment. Pt reports his sober house will likely accept him back in the morning, but no one at the home is answering at this time. Pt has established psychiatry and a sober  he will continue to follow up with. He is agreeable to returning to CD treatment if his sober home requires him to. Plan will be for patient to return to his sober home in the morning once he is able to reach staff who can confirm they are ready to accept him back.   Disposition    Recommended disposition: Medication Management and Other: return to sober living       Reviewed case and recommendations with attending provider. Attending Name: Dr. Zainab Nuñez      Attending concurs with disposition: Yes       Patient and/or validated legal guardian concurs with disposition: Yes       Final disposition: Medication management and Other: return to sober living residence.     Outpatient Details (if applicable):   Aftercare plan and appointments placed in the AVS and provided to patient: Yes.  "Given to patient by ED Nursing Staff     Assessment Details    Patient interview started at: 9:25 pm and completed at: 10:00 pm     Total time spent with the patient or their family: .50 hrs      CPT code(s) utilized: 41754 - Psychotherapy for Crisis - 60 (30-74*) min       MANAS Gonzales, Psychotherapist  DEC - Triage & Transition Services  Callback: [phone]      Aftercare Plan  If I am feeling unsafe or I am in a crisis, I will:   Contact my established care providers   Call the National Suicide Prevention Lifeline: 988  Go to the nearest emergency room   Call 911      Things I am able to do on my own or with others to cope or help me feel better: anything outdoors - fishing, hunting, walking. Talk to and spend time with friends and loved ones. Journal, read, listen to music, color, paint, draw, any other activities you find enjoyment from     Changes I can make to support my mental health and wellness: adhere to treatment recommendations, avoid illicit substances, return to sober living, continue following up with current outpatient providers, follow all recommendations and requirements of current sober residence     People in my life that I can ask for help: family, friends, sober community, sober , outpatient providers, girlfriend     Your [facility] has a mental health crisis team you can call 24/7: [facility] Crisis  [phone]     Crisis Lines  Crisis Text Line  Text 827997  You will be connected with a trained live crisis counselor to provide support.     Por espanol, texto  GIBSON a 267909 o texto a 442-AYUDAME en WhatsApp     The [employer] Project (LGBTQ Youth Crisis Line)  4.534.860.4076  text START to 045-695        Community Resources  Fast Tracker  Linking people to mental health and substance use disorder resources  fasttrackermn.org      [place] Mental Health Warm Line  Peer to peer support  Monday thru Saturday, 12 pm to 10 pm  408.350.2936 or 1.855.231.1992  Text \"Support\" " to 01959     National [place] on Mental Illness (BETSEY)  836.811.0904 or 1.888.BETSEY.HELPS        Mental Health Apps  My3  https://mySingle Cell Technologypp.org/     VirtualHopeBox  https://Channel IQ/apps/virtual-hope-box/        Additional Information  Today you were seen by a licensed mental health professional through Triage and Transition services, [facility] ([facility])  for a crisis assessment in the Emergency Department at [facility].  It is recommended that you follow up with your established providers (psychiatrist, mental health therapist, and/or primary care doctor - as relevant) as soon as possible. Coordinators from [facility] will be calling you in the next 24-48 hours to ensure that you have the resources you need.  You can also contact [facility] coordinators directly at [phone]. You may have been scheduled for or offered an appointment with a mental health provider. [facility] maintains an extensive network of licensed behavioral health providers to connect patients with the services they need.  We do not charge providers a fee to participate in our referral network.  We match patients with providers based on a patient's specific needs, insurance coverage, and location.  Our first effort will be to refer you to a provider within your care system, and will utilize providers outside your care system as needed.       Grounding Techniques:    Try to notice where you are, your surroundings including the people, the sounds like the TV or radio.    Concentrate on your breathing. Take a deep cleansing breath from your diaphragm. Count the breaths as you exhale. Make sure you breath slowly.    Hold something that you find comforting, for some it may be a stuffed animal or a blanket. Notice how it feels in your hands. Is it hard or soft?    During a non-crisis time make a list of positive affirmations. Print them out and keep them handy for times of intense anxiety. At those times, read them aloud.  Try the 04923  game:    Name 5 things you can see in the room with you    Name 4 things you can feel ( chair on my back  or  feet on floor )     Name 3 things you can hear right now ( people talking  or  tv )     Name 2 things you can smell right now (or, 2 things you like the smell of)     Name 1 good thing about yourself  Create A Safe Place    Image a safe place -- it can be a real or imaginary place:     What do you see -- especially colors?     What sounds do you hear?     What sensations do you feel?     What smells do you smell?     What people or animals would you want in your safe place?     Imagine a protective bubble, wall or boundary around your safe place.     Imagine a door or gate with a guard at your safe place.     Image a lock and key to your safe place and only you can unlock it.    You can draw or make a collage that represents your safe place.     Choose a souvenir of your safe place -- a color, an object, a song.     Keep your image of your safe place so you can come back to it when you need to.      Reduce Extreme Emotion  QUICKLY:  Changing Your Body Chemistry      T:  Change your body Temperature to change your autonomic nervous system     Use Ice Water to calm yourself down FAST     Put your face in a bowl of ice water (this is the best way; have the person keep his/her face in ice water for 30-45 seconds - initial research is showing that the longer s/he can hold her/his face in the water, the better the response), or     Splash ice water on your face, or hold an ice pack on your face      I:  Intensely exercise to calm down a body revved up by emotion     Examples: running, walking fast, jumping, playing basketball, weight lifting, swimming, calisthenics, etc.     Engage in exercises that DO NOT include violent behaviors. Exercises that utilize violent behaviors tend to function as  behavioral rehearsal,  and rather than calming the person down, may actually  rev  the person up more, increasing the  likelihood of violence, and lessening the likelihood that they will  burn off  energy     P:  Progressively relax your muscles     Starting with your hands, moving to your forearms, upper arms, shoulders, neck, forehead, eyes, cheeks and lips, tongue and teeth, chest, upper back, stomach, buttocks, thighs, calves, ankles, feet     Tense (10 seconds,   of the way), then relax each muscle (all the way)     Notice the tension     Notice the difference when relaxed (by tensing first, and then relaxing, you are able to get a more thorough relaxation than by simply relaxing)      P: Paced breathing to relax     The standard technique is to begin with counting the number of steps one takes for a typical inhale, then counting the steps one takes for a typical exhale, and then lengthening the amount of steps for the exhalation by one or two steps.  OR    Repeat this pattern for 1-2 minutes    Inhale for four (4) seconds     Exhale for six (6) to eight (8) seconds     Research demonstrated that one can change one's overall level of anxiety by doing this exercise for even a few minutes per day

## 2023-06-24 NOTE — ED PROVIDER NOTES
ED Observation Progress Note  [facility]  Note Date: 6/24/2023    Denilson Colunga MRN: [id number]   Age: 32 year old YOB: 1990     Interval History   Has been sleeping most of the day. Sober house staff not available at this time. DEC assessment done. Patient still has depressed mood.  Not suicidal.  DEC advises continued observation until sober house can be contacted or until outpatient treatment plan can be in place.      Physical Exam   /74   Pulse 94   Temp 97.9  F (36.6  C) (Oral)   Resp 16   Wt 93.4 kg (206 lb)   SpO2 96%   BMI 27.94 kg/m    Physical Exam    GEN:  Alert, well developed, no acute distress  HEENT: Atraumatic  Cardio:  RRR, no murmur, radial pulses equal bilaterally  PULM:  Lungs clear, good air movement, no wheezes, rales   Abd:  Soft, normal bowel sounds, no focal tenderness  Musculoskeletal:  normal range of motion, no lower extremity swelling or calf tenderness  Neuro:  Alert, Follows commands, moving all extremities spontaneously   Skin:  Warm, dry   Psychiatric: Depressed mood, denies suicidal ideation  Results       Assessments & Plan (with Medical Decision Making)   Denilson Colunga is a 32 year old male admitted to the ED Observation Unit with depression, polysubstance abuse.     Services consulted during the observation course: Extended care services. Notable consultant recommendations include: Maintain on observation status until we are able to contact a sober house or develop outpatient plan for treatment    Observation goals to be met before discharge home:  As above    --  Kellie Baron MD  [facility] EMERGENCY DEPARTMENT  6/24/2023      Kellie Baron MD  06/24/23 1513

## 2023-06-24 NOTE — ED NOTES
Patient woke up later for breakfast. He went back to sleep and was up for lunch. Reports he is tired.

## 2023-06-24 NOTE — ED NOTES
There was a delay in getting this patient assigned because they needed an in person , per Dr Nuñez

## 2023-06-24 NOTE — DISCHARGE INSTRUCTIONS
You have been referred to the [facility]. This outpatient service provides short-term, VIRTUAL individual therapy sessions until you begin scheduled services with long-term care providers. Our staff will contact you to schedule. If you have questions, call Transition Clinic at [phone].     Get support and access to outpatient mental health and wellness services for children, adults and families as well as addiction and recovery services for adults at:  [facility].  Address: [address] [place] [address], [address] Floor, [place] [address]  Phone: [phone]  Walk-in evaluations are available in-person (walk into clinic) or virtually (call us) Monday through Thursday from 9 a.m. - 3 p.m. and Friday from 9 a.m. - 2 p.m. with no appointment needed. If you'd like to set up your appointment, please call us at [phone].    Another therapy option:  [employer]:  [facility] Office:  [address] [place] [id number]  Phone: [phone]  Fax: [phone]  Parking: [place]: limited on site parking BEHIND the building    Aftercare Plan  If I am feeling unsafe or I am in a crisis, I will:   Contact my established care providers   Call the National Suicide Prevention Lifeline: 988  Go to the nearest emergency room   Call 911      Warning signs that I or other people might notice when a crisis is developing for me: isolating, strong urge to use, not engaging coping skills, avoiding responsibility, low motivation, procrastination      Things I am able to do on my own to cope or help me feel better: take medications as prescribed, stop using drugs and alcohol, exercise, attend NA meeting, complete daily meditations, look for employment, eat healthy, practice healthy sleep routine, keep a daily schedule. anything outdoors - fishing, hunting, walking. Talk to and spend time with friends and loved ones. Journal, read, listen to music, color,  "paint, draw, any other activities you find enjoyment from     Things that I am able to do with others to cope or help me better: attend NA/AA meeting, participate in sober activities, spend positive, sober time with family, take a walk, watch a movie, job search, meet with sobriety , volunteer      Things I can use or do for distraction: listen to music, take a walk, take a shower, look for work, talk to family, connect with sober peers, exercise, take a nap, deep breath, use my DBT skills     Changes I can make to support my mental health and wellness: take medication as prescribed, don't use drugs or alcohol, eat healthy, keep daily schedule to remind self of appointments and other tasks, keep all appointments with therapists and counselors      People in my life that I can ask for help: parents, siblings, sponsor, sober , sober house staff, therapist, psychiatrist      Your [facility] has a mental health crisis team you can call 24/7: [facility]  [phone] (adults)  [phone] (children)     Other things that are important when I'm in crisis: move to a safe sober place, take several deep breaths, slow down, call a sober support, ask for help      Crisis Lines  Crisis Text Line  Text 458937  You will be connected with a trained live crisis counselor to provide support.    Por espanol, texto  GIBSON a 844131 o texto a 442-AYUDAME en WhatsA    The [employer] Project (LGBTQ Youth Crisis Line)  1.759.295.5977  text START to 653-069      Community Resources  Fast Tracker  Linking people to mental health and substance use disorder resources  fasttrackermn.org     [place] Mental Health Warm Line  Peer to peer support  Monday thru Saturday, 12 pm to 10 pm  926.223.8007 or 0.068.432.5769  Text \"Support\" to 83631    National [place] on Mental Illness (BETSEY)  302.858.0071 or 1.888.BETSEY.HELPS      Mental Health Apps  My3  https://myBolongaro Trevorpp.org/    VirtualHopeBox  " https://Velocify/apps/virtual-hope-box/      Additional Information  Today you were seen by a licensed mental health professional through Triage and Transition services, [facility] (P)  for a crisis assessment in the Emergency Department at [facility].  It is recommended that you follow up with your established providers (psychiatrist, mental health therapist, and/or primary care doctor - as relevant) as soon as possible. Coordinators from [facility] will be calling you in the next 24-48 hours to ensure that you have the resources you need.  You can also contact [facility] coordinators directly at [phone]. You may have been scheduled for or offered an appointment with a mental health provider. [facility] maintains an extensive network of licensed behavioral health providers to connect patients with the services they need.  We do not charge providers a fee to participate in our referral network.  We match patients with providers based on a patient's specific needs, insurance coverage, and location.  Our first effort will be to refer you to a provider within your care system, and will utilize providers outside your care system as needed.      Grounding Techniques:  Try to notice where you are, your surroundings including the people, the sounds like the TV or radio.  Concentrate on your breathing. Take a deep cleansing breath from your diaphragm. Count the breaths as you exhale. Make sure you breath slowly.  Hold something that you find comforting, for some it may be a stuffed animal or a blanket. Notice how it feels in your hands. Is it hard or soft?  During a non-crisis time make a list of positive affirmations. Print them out and keep them handy for times of intense anxiety. At those times, read them aloud.  Try the [employer] [place] game:  Name 5 things you can see in the room with you  Name 4 things you can feel ( chair on my back  or  feet on floor )   Name 3 things you can hear right now ( people  talking  or  tv )   Name 2 things you can smell right now (or, 2 things you like the smell of)   Name 1 good thing about yourself  Create A Safe Place  Image a safe place -- it can be a real or imaginary place:   What do you see -- especially colors?   What sounds do you hear?   What sensations do you feel?   What smells do you smell?   What people or animals would you want in your safe place?   Imagine a protective bubble, wall or boundary around your safe place.   Imagine a door or gate with a guard at your safe place.   Image a lock and key to your safe place and only you can unlock it.  You can draw or make a collage that represents your safe place.   Choose a souvenir of your safe place -- a color, an object, a song.   Keep your image of your safe place so you can come back to it when you need to.     Reduce Extreme Emotion  QUICKLY:  Changing Your Body Chemistry      T:  Change your body Temperature to change your autonomic nervous system   Use Ice Water to calm yourself down FAST   Put your face in a bowl of ice water (this is the best way; have the person keep his/her face in ice water for 30-45 seconds - initial research is showing that the longer s/he can hold her/his face in the water, the better the response), or   Splash ice water on your face, or hold an ice pack on your face      I:  Intensely exercise to calm down a body revved up by emotion   Examples: running, walking fast, jumping, playing basketball, weight lifting, swimming, calisthenics, etc.   Engage in exercises that DO NOT include violent behaviors. Exercises that utilize violent behaviors tend to function as  behavioral rehearsal,  and rather than calming the person down, may actually  rev  the person up more, increasing the likelihood of violence, and lessening the likelihood that they will  burn off  energy     P:  Progressively relax your muscles   Starting with your hands, moving to your forearms, upper arms, shoulders, neck, forehead,  eyes, cheeks and lips, tongue and teeth, chest, upper back, stomach, buttocks, thighs, calves, ankles, feet   Tense (10 seconds,   of the way), then relax each muscle (all the way)   Notice the tension   Notice the difference when relaxed (by tensing first, and then relaxing, you are able to get a more thorough relaxation than by simply relaxing)     P: Paced breathing to relax   The standard technique is to begin with counting the number of steps one takes for a typical inhale, then counting the steps one takes for a typical exhale, and then lengthening the amount of steps for the exhalation by one or two steps.  OR  Repeat this pattern for 1-2 minutes  Inhale for four (4) seconds   Exhale for six (6) to eight (8) seconds   Research demonstrated that one can change one's overall level of anxiety by doing this exercise for even a few minutes per day

## 2023-06-24 NOTE — ED PROVIDER NOTES
"    [facility] EMERGENCY DEPARTMENT ([facility])    23         History     Chief Complaint   Patient presents with     Suicidal     States he had an episode of psychosis over the past 2-3 days, feels better today but is having intrusive suicidal thoughts, denies having a plan     HPI  Denilson Colunga is a 32 year old male who with PMH of heroin use disorder, opiate abuse, polysubstance abuse, SAVANA and  MDD presents to the ED for suicidal ideation.  He says he relapsed 5 days and used heavily 2 days ago. He uses cocaine, strong benzos you buy on the internet and 12 pack of beer.  He was at a sober house and they said he can return after being checked on in the ED. He was feeling suicidal about the relapsed but denies plan or intent.  He wants to return to the sober house but feels anxious about what happened.  He ha hx of opiate dependence and is on suboxone.  H    Dec collateral info today:  The following information was received from Clarisse Colunga whose relationship to the patient is mother. Information was obtained via phone. Her phone number is [phone] and she last had contact with patient on 23.     Pt has depression, anxiety, and addiction. He has undiagnosed autism. He gets overwhelmed easily and does not know what to do with his feelings, causing his emotions to get bottled up. He is not functioning well. He is lonely, has lost his will to live, has no hope, and lacks motivation. He had been sober for three months but relapsed on cocaine, fentanyl, and a \"research chemical benzo\" two days ago. He was kicked out of his sober home yesterday as a result. This morning pt was distraught and said, \"I'm done and you can start planning my \". He has not disclosed a specific plan for suicide. No concerns for HI. Pt's mother is a support for him and is interested in him being formally tested for autism.     MANAS Parekh     Physical Exam   BP: 136/78  Pulse: 113  Temp: 99.2  F " (37.3  C)  Resp: 16  Weight: 93.4 kg (206 lb)  SpO2: 98 %  Physical Exam  Vitals and nursing note reviewed.   Constitutional:       Comments: Tremulous, anxious, alert, disheveled.  No airway concern.    HENT:      Head: Normocephalic and atraumatic.      Mouth/Throat:      Mouth: Mucous membranes are moist.   Eyes:      General:         Right eye: No discharge.         Left eye: No discharge.      Extraocular Movements: Extraocular movements intact.   Cardiovascular:      Rate and Rhythm: Tachycardia present.   Pulmonary:      Effort: Pulmonary effort is normal.   Musculoskeletal:         General: No deformity or signs of injury.      Cervical back: Normal range of motion.      Comments: tremulous   Skin:     Coloration: Skin is not jaundiced or pale.   Neurological:      General: No focal deficit present.      Mental Status: He is alert and oriented to person, place, and time.   Psychiatric:         Attention and Perception: He is inattentive.         Mood and Affect: Mood is anxious.         Behavior: Behavior is slowed.         Thought Content: Suicidal: passive.           ED Course, Procedures, & Data      Procedures       ED Course Selections: -----  Observation Addendum  With this Addendum, this ED Provider Note may also serve as an Observation H&P    Observation Initiation Date: Jun 23, 2023    Patient presenting with suicidal thoughts after relapsing using etoh,cocaine, benzos.    A DEC assessment was completed, and the case was discussed with the . The  recommended observation overnight and discharge back to sober living in am.  He feels he can be safe if he can be discharged back to sober living. See separate DEC note from today's date for details on the assessment.    During the initial care period, the patient did not require medications for agitation, and did not require restraints/seclusion for patient and/or provider safety.     The patient's outpatient medications were reconciled and  ordered.     The patient was found to have a psychiatric condition that would benefit from an observation stay in the emergency department for further psychiatric stabilization and/or coordination of a safe disposition. The observation plan includes serial assessments of psychiatric condition, potential administration of medications if indicated, further disposition pending the patient's psychiatric course during the monitoring period.   -----   Mental Health Risk Assessment      PSS-3    Date and Time Over the past 2 weeks have you felt down, depressed, or hopeless? Over the past 2 weeks have you had thoughts of killing yourself? Have you ever attempted to kill yourself? When did this last happen? User   06/23/23 1847 yes yes no -- TIMOTHY      C-SSRS ([place])    Date and Time Q1 Wished to be Dead (Past Month) Q2 Suicidal Thoughts (Past Month) Q3 Suicidal Thought Method Q4 Suicidal Intent without Specific Plan Q5 Suicide Intent with Specific Plan Q6 Suicide Behavior (Lifetime) Within the Past 3 Months? RETIRED: Level of Risk per Screen Screening Not Complete User   06/23/23 1847 yes yes no no no no -- -- --               Suicide assessment completed by mental health (D.E.C., LCSW, etc.)       Results for orders placed or performed during the hospital encounter of 06/23/23   Drug abuse screen 1 urine (ED)     Status: Abnormal   Result Value Ref Range    Amphetamines Urine Screen Negative Screen Negative    Barbituates Urine Screen Negative Screen Negative    Benzodiazepine Urine Screen Positive (A) Screen Negative    Cannabinoids Urine Screen Negative Screen Negative    Cocaine Urine Screen Positive (A) Screen Negative    Opiates Urine Screen Negative Screen Negative   Comprehensive metabolic panel     Status: Abnormal   Result Value Ref Range    Sodium 135 (L) 136 - 145 mmol/L    Potassium 3.9 3.4 - 5.3 mmol/L    Chloride 99 98 - 107 mmol/L    Carbon Dioxide (CO2) 24 22 - 29 mmol/L    Anion Gap 12 7 - 15 mmol/L     Urea Nitrogen 25.4 (H) 6.0 - 20.0 mg/dL    Creatinine 1.13 0.67 - 1.17 mg/dL    Calcium 8.8 8.6 - 10.0 mg/dL    Glucose 103 (H) 70 - 99 mg/dL    Alkaline Phosphatase 133 (H) 40 - 129 U/L    AST 49 (H) 0 - 45 U/L    ALT 48 0 - 70 U/L    Protein Total 7.0 6.4 - 8.3 g/dL    Albumin 4.3 3.5 - 5.2 g/dL    Bilirubin Total 0.4 <=1.2 mg/dL    GFR Estimate 89 >60 mL/min/1.73m2   CBC with platelets and differential     Status: Abnormal   Result Value Ref Range    WBC Count 6.9 4.0 - 11.0 10e3/uL    RBC Count 4.32 (L) 4.40 - 5.90 10e6/uL    Hemoglobin 12.3 (L) 13.3 - 17.7 g/dL    Hematocrit 36.9 (L) 40.0 - 53.0 %    MCV 85 78 - 100 fL    MCH 28.5 26.5 - 33.0 pg    MCHC 33.3 31.5 - 36.5 g/dL    RDW 13.2 10.0 - 15.0 %    Platelet Count 347 150 - 450 10e3/uL    % Neutrophils 58 %    % Lymphocytes 23 %    % Monocytes 11 %    % Eosinophils 6 %    % Basophils 1 %    % Immature Granulocytes 1 %    NRBCs per 100 WBC 0 <1 /100    Absolute Neutrophils 4.0 1.6 - 8.3 10e3/uL    Absolute Lymphocytes 1.6 0.8 - 5.3 10e3/uL    Absolute Monocytes 0.8 0.0 - 1.3 10e3/uL    Absolute Eosinophils 0.4 0.0 - 0.7 10e3/uL    Absolute Basophils 0.1 0.0 - 0.2 10e3/uL    Absolute Immature Granulocytes 0.0 <=0.4 10e3/uL    Absolute NRBCs 0.0 10e3/uL   Urine Drugs of Abuse Screen     Status: Abnormal    Narrative    The following orders were created for panel order Urine Drugs of Abuse Screen.  Procedure                               Abnormality         Status                     ---------                               -----------         ------                     Drug abuse screen 1 urin...[[id number]]  Abnormal            Final result                 Please view results for these tests on the individual orders.   CBC with platelets differential     Status: Abnormal    Narrative    The following orders were created for panel order CBC with platelets differential.  Procedure                               Abnormality         Status                      ---------                               -----------         ------                     CBC with platelets and d...[[id number]]  Abnormal            Final result                 Please view results for these tests on the individual orders.     Medications   buprenorphine HCl-naloxone HCl (SUBOXONE) 8-2 MG per film 1 Film (has no administration in time range)   buprenorphine HCl-naloxone HCl (SUBOXONE) 4-1 MG per film 1 Film (has no administration in time range)   OLANZapine zydis (zyPREXA) ODT tab 5 mg (5 mg Oral $Given 6/23/23 1935)   ibuprofen (ADVIL/MOTRIN) tablet 600 mg (600 mg Oral $Given 6/23/23 2050)     Labs Ordered and Resulted from Time of ED Arrival to Time of ED Departure   DRUG ABUSE SCREEN 1 URINE (ED) - Abnormal       Result Value    Amphetamines Urine Screen Negative      Barbituates Urine Screen Negative      Benzodiazepine Urine Screen Positive (*)     Cannabinoids Urine Screen Negative      Cocaine Urine Screen Positive (*)     Opiates Urine Screen Negative     COMPREHENSIVE METABOLIC PANEL - Abnormal    Sodium 135 (*)     Potassium 3.9      Chloride 99      Carbon Dioxide (CO2) 24      Anion Gap 12      Urea Nitrogen 25.4 (*)     Creatinine 1.13      Calcium 8.8      Glucose 103 (*)     Alkaline Phosphatase 133 (*)     AST 49 (*)     ALT 48      Protein Total 7.0      Albumin 4.3      Bilirubin Total 0.4      GFR Estimate 89     CBC WITH PLATELETS AND DIFFERENTIAL - Abnormal    WBC Count 6.9      RBC Count 4.32 (*)     Hemoglobin 12.3 (*)     Hematocrit 36.9 (*)     MCV 85      MCH 28.5      MCHC 33.3      RDW 13.2      Platelet Count 347      % Neutrophils 58      % Lymphocytes 23      % Monocytes 11      % Eosinophils 6      % Basophils 1      % Immature Granulocytes 1      NRBCs per 100 WBC 0      Absolute Neutrophils 4.0      Absolute Lymphocytes 1.6      Absolute Monocytes 0.8      Absolute Eosinophils 0.4      Absolute Basophils 0.1      Absolute Immature Granulocytes 0.0      Absolute  NRBCs 0.0       No orders to display          Critical care was not performed.     Medical Decision Making  The patient's presentation was of high complexity (a chronic illness severe exacerbation, progression, or side effect of treatment).    The patient's evaluation involved:  an assessment requiring an independent historian (mother)  ordering and/or review of 3+ test(s) in this encounter (see separate area of note for details)  discussion of management or test interpretation with another health professional (dec )    The patient's management necessitated high risk (a decision regarding hospitalization).      Assessment & Plan    Denilson Colunga is a 32 year old male who with PMH of heroin use disorder, opiate abuse, polysubstance abuse, SAVANA and  MDD presents to the ED for suicidal ideation after relapse 5 days.  He was living in a sober house and can return there but they want him to be evaluated before returning.  He says he used cocaine, etoh and benzos during his relapse but does not use them routinely to require detox admission.  He was seen by myself and the DEC . He has passive si but no plan or intent.  He is appropriate to return to his sober house but we cannot get ahold of them tonight.  Therefore he will remain in the ED tonight and will work on contacting sober house/discharge in am.  He is not holdable and can leave if he wishes. His routine suboxone was ordered while he is in the ED. He was given zyprexa for anxiety. Routine labs were ordered initially for possible detox admission but he does not require detox based on history.     I have reviewed the nursing notes. I have reviewed the findings, diagnosis, plan and need for follow up with the patient.    New Prescriptions    No medications on file       Final diagnoses:   Episode of recurrent major depressive disorder, unspecified depression episode severity (H)   SAVANA (generalized anxiety disorder)   Cocaine use       Zainab Nuñez  MD [facility] EMERGENCY DEPARTMENT  6/23/2023     Zainab Nuñez MD  06/23/23 4144       Zainab Nuñez MD  06/24/23 0005

## 2023-06-25 PROCEDURE — G0378 HOSPITAL OBSERVATION PER HR: HCPCS

## 2023-06-25 PROCEDURE — 250N000013 HC RX MED GY IP 250 OP 250 PS 637: Performed by: EMERGENCY MEDICINE

## 2023-06-25 RX ADMIN — MIRTAZAPINE 15 MG: 15 TABLET, FILM COATED ORAL at 21:09

## 2023-06-25 RX ADMIN — BUPRENORPHINE AND NALOXONE 1 FILM: 4; 1 FILM, SOLUBLE BUCCAL; SUBLINGUAL at 12:43

## 2023-06-25 RX ADMIN — NICOTINE POLACRILEX 4 MG: 4 GUM, CHEWING BUCCAL at 17:22

## 2023-06-25 RX ADMIN — HYDROXYZINE HYDROCHLORIDE 50 MG: 25 TABLET, FILM COATED ORAL at 17:15

## 2023-06-25 RX ADMIN — IBUPROFEN 400 MG: 200 TABLET, FILM COATED ORAL at 09:39

## 2023-06-25 RX ADMIN — BUPRENORPHINE AND NALOXONE 1 FILM: 8; 2 FILM, SOLUBLE BUCCAL; SUBLINGUAL at 09:32

## 2023-06-25 RX ADMIN — HYDROXYZINE HYDROCHLORIDE 50 MG: 25 TABLET, FILM COATED ORAL at 09:35

## 2023-06-25 RX ADMIN — BUSPIRONE HYDROCHLORIDE 15 MG: 15 TABLET ORAL at 09:32

## 2023-06-25 RX ADMIN — NICOTINE POLACRILEX 4 MG: 4 GUM, CHEWING BUCCAL at 11:52

## 2023-06-25 RX ADMIN — VENLAFAXINE HYDROCHLORIDE 300 MG: 150 CAPSULE, EXTENDED RELEASE ORAL at 09:32

## 2023-06-25 RX ADMIN — BUSPIRONE HYDROCHLORIDE 15 MG: 15 TABLET ORAL at 20:13

## 2023-06-25 RX ADMIN — BUPRENORPHINE AND NALOXONE 1 FILM: 4; 1 FILM, SOLUBLE BUCCAL; SUBLINGUAL at 21:09

## 2023-06-25 RX ADMIN — IBUPROFEN 400 MG: 200 TABLET, FILM COATED ORAL at 17:15

## 2023-06-25 ASSESSMENT — ACTIVITIES OF DAILY LIVING (ADL)
ADLS_ACUITY_SCORE: 35

## 2023-06-25 NOTE — ED NOTES
Triage & Transition Services, Extended Care     Client Name: Denilson Colunga  Date: June 25, 2023    Sent [facility] referral. [facility] called back and declined referral, stating that patient needs more detoxing and recommended we call [facility]. [facility] TINO updated.     Natalia Hay

## 2023-06-25 NOTE — ED NOTES
Pt slept throughout the shift with no awakenings. Pt showed no s/s of acute distress during the night. Pt was noted to be breathing with ease. Will continue to monitor.

## 2023-06-25 NOTE — ED NOTES
"Triage & Transition Services, Extended Care     Client Name: Denilson Colunga  Date: June 25, 2023    Patient signed COLLEEN for [facility]. Writer spoke with [facility] and they said patient has \"[employer] and [facility] [place]\", which appear to be commercial plans. They are unable to accept this type of insurance and therefore this referral.     Natalia Hay  "

## 2023-06-25 NOTE — ED NOTES
Patient was up for breakfast. Reported he slept well last night. Reports anxiety 5/10 requested/administered hydroxyzine. Also requested/administered ibuprofen for bilateral knee pain. Denies SI HI and hallucinations. He is calm pleasant and cooperative. No behavior concerns.

## 2023-06-25 NOTE — PROGRESS NOTES
"Triage & Transition Services, Extended Care     Therapy Progress Note    Patient: Denilson goes by \"Denilson,\" uses he/him pronouns  Date of Service: June 25, 2023  Site of Service: [facility]  Patient was seen virtually ([employer] cart or other teleconferencing device).     Presenting problem:   Denilson is followed related to Boarding Status. Please see initial DEC/[facility] Crisis Assessment completed by Astrid Dalton on 6/23/23 for complete assessment information. Notable concerns include SI in context to substance abuse, seeking detox    Individuals Present: Denilson & Guerline Javan, [facility]    Session start: 918  Session end: 936  Session duration in minutes: 18  Session number: 2  Anticipated number of sessions or this episode of care: 1-4  CPT utilized: 27120 - Psychotherapy (with patient) - 30 (16-37*) min    Current Presentation:   Pt reports he has been feeling better, has been sleeping quite a lot since arriving in ED.  Mood is stable, reports anxiety is at about a 5 on scale of 0-10 with 10 being most severe.  Pt reports this is baseline for him.  Discussed possibly discharging to crisis residence, pt is agreeable to this.  Reports he would like to return to his sober living residence and knows the team needs to meet on Monday to determine if he can return.  Pt is open to therapy, states he has been working on getting an appointment at [facility] consulting which is walking distance from his sober living residence.  States he has not yet scheduled the appointment.  Is open to referral for therapy.  Discussed aftercare planning and supports needed to promote sobriety and wellbeing.  Pt reports he had been sober for nearly 6 months before recent relapse, he believes with increased NA/AA meeting, connecting with a sponsor and working with  through [facility] in addition to individual therapy.     Mental Status Exam:   Appearance: awake, alert, dressed in hospital scrubs and slightly unkempt  Attitude: cooperative  Eye " Contact: good  Mood: anxious  Affect: mood congruent  Speech: clear, coherent  Psychomotor Behavior: no evidence of tardive dyskinesia, dystonia, or tics  Thought Process:  goal oriented  Associations: no loose associations  Thought Content: no evidence of suicidal ideation or homicidal ideation, no auditory hallucinations present and no visual hallucinations present  Insight: good  Judgement: fair  Oriented to: time, person, and place  Attention Span and Concentration: intact  Recent and Remote Memory: intact    Diagnosis:   Major depressive disorder, Recurrent episode, Unspecified F33.9 - primary, by history                                        Generalized anxiety disorder  F41.1 - by history       Therapeutic Intervention(s):   Provided active listening, unconditional positive regard, and validation. Engaged in safety planning.  Reviewed healthy living that supports positive mental health, including looking at sleep hygiene, regular movement, nutrition, and regular socialization. Identified and practiced coping skills.    Treatment Objective(s) Addressed:   The focus of this session was on identifying and practicing coping strategies, safety planning, identifying an appropriate aftercare plan, assessing safety, identifying treatment goals and identifying additional supports .     Progress Towards Goals:   Patient reports improving symptoms. Patient is making progress towards treatment goals as evidenced by improved insight, lower anxiety, forward thinking.     Case Management:   Contact with [facility] for Crisis resident placement.  Initially pt was declined due to insurance not being accepted.  With mother's assistance, were able to locate pt's current insurance numbers through [facility], pt is currently covered by united health care plan, on July 1 will be under [facility].  Referral form completed, Dr. Baron is agreeable to plan. Verbal report provided to [facility] clinical on-call, Edda.  Pt's insurance is  accepted by program, Edda believes pt will be appropriate for crisis residence and will review referral information as soon as it arrives.       General Recommendations:   Continue to monitor for harm. Consider: Provide the pt with options to provide a sense of control. Try to tell the pt what they can do instead of what they can't do, Listen in a neutral, non-judgmental way. Offer reassurance and Be mindful of your nonverbal cues (body language, facial expressions)    Plan:   Discharge: pt to discharge to [facility] residence in [facility].  Pt has been provided resources for mental health therapy, encouraged to apply for [facility] case management services in addition to [facility] for [facility].      If [facility], pt will need to discharge Monday am with above referrals after connecting with [facility] program.        Plan for Care reviewed with Assigned Medical Provider? Yes. Provider, Dr Baron, response: supports plan     Guerline Edouard [facility]   Licensed Mental Health Professional (LMHP), Extended Care  [phone]    Aftercare Plan  If I am feeling unsafe or I am in a crisis, I will:   Contact my established care providers   Call the National Suicide Prevention Lifeline: 988  Go to the nearest emergency room   Call 911     Warning signs that I or other people might notice when a crisis is developing for me: isolating, strong urge to use, not engaging coping skills, avoiding responsibility, low motivation, procrastination    Things I am able to do on my own to cope or help me feel better: take medications as prescribed, stop using drugs and alcohol, exercise, attend NA meeting, complete daily meditations, look for employment, eat healthy, practice healthy sleep routine, keep a daily schedule.     Things that I am able to do with others to cope or help me better: attend NA/AA meeting, participate in sober activities, spend positive, sober time  with family, take a walk, watch a movie, job search, meet with sobriety , volunteer     Things I can use or do for distraction: listen to music, take a walk, take a shower, look for work, talk to family, connect with sober peers, exercise, take a nap     Changes I can make to support my mental health and wellness: take medication as prescribed, don't use drugs or alcohol, eat healthy, keep daily schedule to remind self of appointments and other tasks, keep all appointments with therapists and counselors     People in my life that I can ask for help: parents, siblings, sponsor, sober , sober house staff, therapist, psychiatrist     Your [facility] has a mental health crisis team you can call 24/7: [facility] Mobile Crisis  [phone] (adults)  [phone] (children)    Other things that are important when I'm in crisis: move to a safe sober place, take several deep breaths, slow down, call a sober support, ask for help     [employer]:  [facility] Office:  [address] [place] [id number]  Phone: [phone]  Fax: [phone]  Parking:  [place]: limited on site parking BEHIND the building    Additional resources and information: The following DBT skills can assist me when: I want to act on your emotions and acting on them will only make things worse, I am overwhelmed by my emotions, I want to try to be skillful and not act on self destructive behavior.     Reduce Extreme Emotion  QUICKLY:  Changing Your Body Chemistry       T:  Change your body Temperature to change your autonomic nervous system    Use Ice pack to calm yourself down FAST. Place ice pack underneath your eyes for a count of 30 seconds to initiate the divers reflex which will naturally calm down your heart rate and breathing.      I:  Intensely exercise to calm down a body revved up by emotion    Examples: running, walking fast, jumping, playing basketball, weight lifting, swimming, calisthenics, etc.       Engage in exercises that DO NOT include violent behaviors. Exercises that utilize violent behaviors tend to function as  behavioral rehearsal,  and rather than calming the person down, may actually  rev  the person up more, increasing the likelihood of violence, and lessening the likelihood that they will  burn off  energy     P:  Progressively relax your muscles    Starting with your hands, moving to your forearms, upper arms, shoulders, neck, forehead, eyes, cheeks and lips, tongue and teeth, chest, upper back, stomach, buttocks, thighs, calves, ankles, feet      Tense (10 seconds,   of the way), then relax each muscle (all the way)    Notice the tension    Notice the difference when relaxed (by tensing first, and then relaxing, you are able to get a more thorough relaxation than by simply relaxing)      P: Paced breathing to relax    The standard technique is to begin with counting the number of steps one takes for a typical inhale, then counting the steps one takes for a typical exhale, and then lengthening the amount of steps for the exhalation by one or two steps.  OR repeat this pattern for 1-2 minutes:   Inhale for four (4) seconds    Exhale for six (6) to eight (8) seconds        After using Distress Tolerance TIPP, TRY TO STOP!     S- Stop    Do not just react on your emotion urge. Stop! Freeze! Do not move a muscle! Your emotions may try to make you act without thinking. Stay in control! Take a step back Take a step back from the situation.    T- Take a break    Let go. Take a deep breath. Do not let your feelings make you act impulsively.    O- Observe    Notice what is going on inside and outside you. What is the situation? What are your thoughts and feelings? What are others saying or doing? Does my emotion make sense, is it justified? What is it that my emotions want me to do? Would that be effective?    P- Proceed mindfully    Act with awareness. In deciding what to do, consider your thoughts and  feelings, the situation, and other people s thoughts and feelings. Think about your goals. Ask Wise Mind: Which actions will make it better or worse?        If my emotion action urge would not be effective or helpful, practice acting OPPOSITE to the EMOTION ACTION URGE can help reduce the intensity or even change the emotion.   Consider these examples: with FEAR we have the urge to run away/avoid. OPPOSITE would be to approach it with caution. ANGER we have the urge to attack. OPPOSITE would be to gently avoid or to demonstrate kindness towards it. SADNESS we have the urge to withdraw/isolate. OPPOSITE would be to get self to move and be active physically or socially.      These additional skills may help with self-soothing and distracting you:      Activities   Focus attention on a task you need to get done. Rent movies; watch TV. Clean a room in your house. Find an event to go to. Play computer games. Go walking. Exercise. Surf the Internet. Write e-mails. Play sports. Go out for a meal or eat a favorite food. Call or go out with a friend. Listen to your iPod; download music. Build something. Spend time with your children. Play cards. Read magazines, books, comics. Do crossword puzzles or Sudoku.     Emotions   Read emotional books or stories, old letters. Watch emotional TV shows; go to emotional movies. Listen to emotional music. (Be sure the event creates different emotions.) Ideas: Scary movies, joke books, comedies, funny records, Christian music, soothing music or music that fires you up, going to a store and reading funny greeting cards.     Thoughts   Count to 10; count colors in a painting or poster or out the window; count anything. Repeat words to a song in your mind. Work puzzles. Watch TV or read.     Sensations   Squeeze a rubber ball very hard. Listen to very loud music. Hold ice in your hand or mouth. Go out in the rain or snow. Take a hot or cold shower.   Remember that you can use your 5 senses  "as helpful self-soothing tools!       I can help my own emotions by practicing the following to keep my emotional mind healthy and bring positive emotions:     The ABC PLEASE skill is about taking good care of ourselves so that we can take care of others. Also, an important component of DBT is to reduce our vulnerability. When we take good care of ourselves, we are less likely to be vulnerable to disease and emotional crisis.     ABC   A- Accumulate positive emotions by doing things that are pleasant.   B- Build mastery by doing things we enjoy. Whether it is reading, cooking, cleaning, fixing a car, working a cross word puzzle, or playing a musical instrument. Practice these things to  and in time we feel competent.   C- [place] Ahead by rehearsing a plan ahead of time so that we can be prepared to cope skillfully. (Think of what makes situations difficult, and what helps in those situations)      PLEASE   Treat Physical Illness and take medications as prescribed.   Balance eating in order to avoid mood swings.   Avoid mood-Altering substances and have mood control.   Maintain good sleep so you can enjoy your life.   Get exercise to maintain high spirits.           Crisis Lines  Crisis Text Line  Text 494868  You will be connected with a trained live crisis counselor to provide support.    Por espanol, texto  GIBSON a 546744 o texto a 442-AYUDAME en WhatsApp    The [employer] Project (LGBTQ Youth Crisis Line)  0.839.797.1259  text START to 980-278      Community Affirm  Fast Tracker  Linking people to mental health and substance use disorder resources  fasttrackermn.org     [place] Mental Health Warm Line  Peer to peer support  Monday thru Saturday, 12 pm to 10 pm  668.285.6893 or 5.648.743.4804  Text \"Support\" to 50370    National Dale on Mental Illness (BETSEY)  150.008.6302 or 1.888.BETSEY.HELPS      Mental Health Apps  My3  https://myTagTagCity.org/    [employer]  " https://Learnhive/apps/virtual-hope-box/      Additional Information  Today you were seen by a licensed mental health professional through Triage and Transition services, [facility] (P)  for a crisis assessment in the Emergency Department at [facility].  It is recommended that you follow up with your established providers (psychiatrist, mental health therapist, and/or primary care doctor - as relevant) as soon as possible. Coordinators from [facility] will be calling you in the next 24-48 hours to ensure that you have the resources you need.  You can also contact [facility] coordinators directly at [phone]. You may have been scheduled for or offered an appointment with a mental health provider. [facility] maintains an extensive network of licensed behavioral health providers to connect patients with the services they need.  We do not charge providers a fee to participate in our referral network.  We match patients with providers based on a patient's specific needs, insurance coverage, and location.  Our first effort will be to refer you to a provider within your care system, and will utilize providers outside your care system as needed.

## 2023-06-25 NOTE — ED NOTES
Patient complained of low back pain and anxiety. Patient was given hydroxyzine pamoate 50 mg and ibuprofen 400 mg. Patient was compliant with scheduled medication.

## 2023-06-26 VITALS
SYSTOLIC BLOOD PRESSURE: 118 MMHG | BODY MASS INDEX: 27.94 KG/M2 | TEMPERATURE: 97.9 F | OXYGEN SATURATION: 99 % | DIASTOLIC BLOOD PRESSURE: 77 MMHG | HEART RATE: 88 BPM | RESPIRATION RATE: 16 BRPM | WEIGHT: 206 LBS

## 2023-06-26 PROCEDURE — 250N000013 HC RX MED GY IP 250 OP 250 PS 637: Performed by: EMERGENCY MEDICINE

## 2023-06-26 PROCEDURE — 99232 SBSQ HOSP IP/OBS MODERATE 35: CPT | Performed by: FAMILY MEDICINE

## 2023-06-26 PROCEDURE — G0378 HOSPITAL OBSERVATION PER HR: HCPCS

## 2023-06-26 RX ADMIN — NICOTINE POLACRILEX 4 MG: 4 GUM, CHEWING BUCCAL at 12:18

## 2023-06-26 RX ADMIN — NICOTINE POLACRILEX 4 MG: 4 GUM, CHEWING BUCCAL at 20:59

## 2023-06-26 RX ADMIN — NICOTINE POLACRILEX 4 MG: 4 GUM, CHEWING BUCCAL at 18:28

## 2023-06-26 RX ADMIN — BUSPIRONE HYDROCHLORIDE 15 MG: 15 TABLET ORAL at 21:41

## 2023-06-26 RX ADMIN — BUSPIRONE HYDROCHLORIDE 15 MG: 15 TABLET ORAL at 08:46

## 2023-06-26 RX ADMIN — NICOTINE POLACRILEX 4 MG: 4 GUM, CHEWING BUCCAL at 14:46

## 2023-06-26 RX ADMIN — IBUPROFEN 400 MG: 200 TABLET, FILM COATED ORAL at 18:29

## 2023-06-26 RX ADMIN — BUPRENORPHINE AND NALOXONE 1 FILM: 4; 1 FILM, SOLUBLE BUCCAL; SUBLINGUAL at 13:04

## 2023-06-26 RX ADMIN — BUPRENORPHINE AND NALOXONE 1 FILM: 8; 2 FILM, SOLUBLE BUCCAL; SUBLINGUAL at 08:47

## 2023-06-26 RX ADMIN — HYDROXYZINE HYDROCHLORIDE 50 MG: 25 TABLET, FILM COATED ORAL at 18:29

## 2023-06-26 RX ADMIN — BUPRENORPHINE AND NALOXONE 1 FILM: 4; 1 FILM, SOLUBLE BUCCAL; SUBLINGUAL at 21:41

## 2023-06-26 RX ADMIN — VENLAFAXINE HYDROCHLORIDE 300 MG: 150 CAPSULE, EXTENDED RELEASE ORAL at 08:46

## 2023-06-26 RX ADMIN — MIRTAZAPINE 15 MG: 15 TABLET, FILM COATED ORAL at 21:41

## 2023-06-26 ASSESSMENT — ACTIVITIES OF DAILY LIVING (ADL)
ADLS_ACUITY_SCORE: 35

## 2023-06-26 NOTE — ED NOTES
Patient denies any feelings of SI, HI or any hallucinations. He is pleasant and cooperative. No signs symptoms of any withdrawal. He is medication compliant.

## 2023-06-26 NOTE — ED NOTES
Report received from previous shift. Assumed care of patient sleeping with no s/s of acute distress. Pt noted to be breathing with ease. Will continue to monitor.

## 2023-06-26 NOTE — ED PROVIDER NOTES
ED Observation Progress Note  [facility]  Note Date: 6/26/2023    Denilson Colunga MRN: [id number]   Age: 32 year old YOB: 1990     Interval History   Continues to improve.  Vital signs generally better.  Eating and voiding well.  Tolerating medications without significant side effects.  No new concerns today.  Patient anxious to get back to his sober living facility.    Physical Exam   /77   Pulse 88   Temp 97.9  F (36.6  C) (Oral)   Resp 16   Wt 93.4 kg (206 lb)   SpO2 99%   BMI 27.94 kg/m    Physical Exam  General: . Appears stated age.   HENT: MMM, no oropharyngeal lesions  Eyes: PERRL, normal sclerae   Cardio: Regular rate, extremities well perfused  Resp: Normal work of breathing, normal respiratory rate  Neuro: alert and fully oriented. CN II-XII grossly intact. Grossly normal strength and sensation in all extremities.   MSK: no deformities. Grossly normal ROM.  Integumentary/Skin: no rash visualized, normal color  Psych: Calm and cooperative.  Denies SI/HI.  No signs of psychosis    Results       Assessments & Plan (with Medical Decision Making)   Denilson Colunga is a 32 year old male admitted to ED Observation status with depression and polysubstance abuse.  Had a brief relapse, and was feeling suicidal.  Has been held in the behavioral emergency center for several days now, states they are feeling better and are back to their baseline.     On this date, the patient did not require medications for agitation, and did not require restraints/seclusion for patient and/or provider safety.     Notable events and plan updates today: Patient is being followed by the extended care team.  They spoke with the staff at the patient's sober living facility.  It is likely the patient can return there, however they need to have a meeting with the residents there to be certain that he would be accepted back there.  Patient is eager to return there if possible.  We are  waiting to hear back, potentially could be discharged tonight versus tomorrow if the sober living agrees to have him return.  He is not on a 72-hour hold.    The patient's condition is such that further monitoring for psychiatric stabilization and/or coordination of a safe disposition is still indicated. The observation plan includes serial assessments of psychiatric condition, potential administration of medications if indicated, further disposition pending the patient's psychiatric course during the monitoring period.     --  Avery Hunt MD  [facility] EMERGENCY DEPARTMENT  6/26/2023        Avery Hunt MD  06/26/23 150

## 2023-06-26 NOTE — PROGRESS NOTES
Triage & Transition Services, Extended Care    Client Name: Denilson Colunga    Date: June 26, 2023  Service Type:  Group Therapy  Site Location: [facility]  Facilitator: DONG Rangel     Topic:   What are my needs?     Intervention:    Group process: support, challenge, affirm, psycho-education.     Response:  Patient did not participate in group. Patient considered attending but ultimately decided to make a phone call during group time.     DONG Rangel

## 2023-06-26 NOTE — ED NOTES
Pt has slept throughout the shift. Pt has shown no s/s of acute distress. Pt was noted to be breathing with ease. Pt had no safety concerns or events during the night shift. Will continue to monitor.

## 2023-06-26 NOTE — PROGRESS NOTES
"Triage & Transition Services, Extended Care      Client Name: Denilson Colunga \"Denilson\"   Date: June 26, 2023  Service Type:  Group Therapy  Site Location: [facility]  Facilitator: Natalia Hay     Topic:   Art Group: Collaging     Intervention:    Patient was in his room and writer asked pt if he would like to participate in group.     Response:  Patient declined participating in group and did not participate in group.     Natalia Hay  Extended Care Coordinator  "

## 2023-06-26 NOTE — PROGRESS NOTES
Triage & Transition Services, Extended Care     Denilson Colunga  June 26, 2023    Denilson is followed related to Boarding Status. Please see initial DEC Crisis Assessment completed for complete assessment information. Medical record is reviewed.  While patient is in the ED, care team is working towards Learn and Demonstrate at [employer] Skill Focused on Crisis Stabilization.     Patient reports he feels that he is doing better.  He denies having suicidal ideation, plan or intent.  He denies SIB, HI, or hallucinations.      Discussed [facility] is not an option today.  There are no openings.  He reports that he would like to return to his sober home.  He asks for assistance in communicating with them for a possible return.      Patient signed an COLLEEN for his sober living.  Contacts are Laura, his sober , ([phone]) and owner Herb ([phone]).  Clinician left a message for Laura.  Clinician was able to speak with owner, Herb.  He reports a meeting needs to take place today with involvement from the other residents before agreeing to patient's return.     Plan:  Discharge: [facility] reports they need to have a meeting today about patient's potential return.  Owner states he will attempt to pull the meeting together today and could take patient back in the morning dependent on the meeting.  He states the other sober home residents have input on patient's return.    Plan for Care reviewed with Assigned Medical Provider? Yes. Provider, Dr. Hunt, response: agreement    Extended Care will follow and meet with patient/family/care team as able or requested.     Jesica Bennett, [facility], Extended Care   [phone]

## 2023-06-27 ENCOUNTER — TELEPHONE (OUTPATIENT)
Dept: BEHAVIORAL HEALTH | Facility: CLINIC | Age: 33
End: 2023-06-27
Payer: MEDICAID

## 2023-06-27 PROCEDURE — 250N000013 HC RX MED GY IP 250 OP 250 PS 637: Performed by: EMERGENCY MEDICINE

## 2023-06-27 PROCEDURE — G0378 HOSPITAL OBSERVATION PER HR: HCPCS

## 2023-06-27 PROCEDURE — 99238 HOSP IP/OBS DSCHRG MGMT 30/<: CPT | Performed by: FAMILY MEDICINE

## 2023-06-27 RX ADMIN — BUSPIRONE HYDROCHLORIDE 15 MG: 15 TABLET ORAL at 09:34

## 2023-06-27 RX ADMIN — VENLAFAXINE HYDROCHLORIDE 300 MG: 150 CAPSULE, EXTENDED RELEASE ORAL at 09:35

## 2023-06-27 RX ADMIN — BUPRENORPHINE AND NALOXONE 1 FILM: 8; 2 FILM, SOLUBLE BUCCAL; SUBLINGUAL at 09:34

## 2023-06-27 RX ADMIN — NICOTINE POLACRILEX 4 MG: 4 GUM, CHEWING BUCCAL at 09:39

## 2023-06-27 RX ADMIN — HYDROXYZINE HYDROCHLORIDE 50 MG: 25 TABLET, FILM COATED ORAL at 09:39

## 2023-06-27 ASSESSMENT — ACTIVITIES OF DAILY LIVING (ADL)
ADLS_ACUITY_SCORE: 35

## 2023-06-27 NOTE — ED NOTES
Patient was discharged today at 11:00 am back his sober home. Writer reviewed discharged summary with patient. Patient verbalized understanding of the discharged summary.

## 2023-06-27 NOTE — PROGRESS NOTES
"Triage & Transition Services, Extended Care      Client Name: Denilson Colunga \"Denilson\"   Date: June 26, 2023  Service Type:  Group Therapy  Site Location: [facility]  Facilitator: Natalia Hay     Topic:   TIPP Skill practice     Patient was in the lounge and did not participate in group.     Natalia Hay  Extended Care Coordinator  "

## 2023-06-27 NOTE — PROGRESS NOTES
Triage & Transition Services, Extended Care     Denilson Colunga  June 27, 2023    Denilson is followed related to care coordination and brief therapeutic support  Please see initial DEC Crisis Assessment  for complete assessment information. Medical record is reviewed.     Patient is alert and orientated x2. Patient is calm, engaged and cooperative. Patient denies SI/HI/NSSIB. Patient reports he is doing well and ready to return to his sober living home. Patient actively engaged in reviewing safety plan and discussing outpatient services.     Writer spoke with sober living facility staff Herb. Reviewed care plan and recommendation for discharge. Herb will  patient at 10:30am today.     Plan:  Discharge: Return to sober living facility. Patient denies SI/HI/NSSIB. Patient reports he is willing and wanting to engage in outpatient services and return to his sober living facility. Sober living staff will  patient today at 10:30am.     Plan for Care reviewed with Assigned Medical Provider? Yes. Provider, Dr. Bach, response: acknowledged.     Extended Care will follow and meet with patient/family/care team as able or requested.     Emily Watt, [facility], Extended Care   [phone]

## 2023-06-27 NOTE — ED NOTES
Patient is out on the unit on and off. He declined any groups today. He denies feelings of SI, HI and no hallucinations.

## 2023-06-27 NOTE — TELEPHONE ENCOUNTER
Called the patient at [facility], RN stated to call back in 10 minutes because patient phone is unavailable at this time.    Tanja Cox  Transition Clinic Coordinator  Date and Time: 06/27/23 9:57 AM        ----- Message from Syeda Soliz sent at 6/27/2023  9:20 AM CDT -----  Regarding: TC Referral  Transition Clinic Referral   [place]         Please Check Type of Referral Requested:       __X__THERAPY: The Transition clinic is able to schedule patients without current medical insurance; these patient will be referred to our Social Work Care Coordinator for Medical Insurance              Assistance. We are open for referral for psychotherapy. Patient is referred from:  Extended Care      ____MEDICATION:  Referrals for Medication are ONLY accepted from the following areas (select): Emergency Department/Urgent Care - HIGH PRIORITY                                       Suboxone and Opioid Management Referrals are automatically denied. TC Psychiatry cannot see patient without active medical insurance.   TC Psychiatry cannot accept patient with next level of care scheduled with PCP      GUARDIAN: If your patient is not their own Guardian, please provide the following:    Guardian Name:  Guardian Contact Information (Phone & Email) :  Guardian Address:     FOSTER CARE PROVIDER: If your patient lives at a Licensed Foster Care, please provide the following:    Foster Provider:  Foster Provider Contact Information (Phone & Email):  Foster Provider Address:         Referring Provider Contact Name: Emily Watt; Phone Number: [phone]    Reason for Transition Clinic Referral: [place]    Next Level of Care Patient Will Be Transitioned To: Therapy  Provider(s)TBD  Location [facility]  Date/Time TBD    What Would Be Helpful from the Transition Clinic: Patient has started the process to establish therapy services at [facility] but has not been assigned at provider yet at this time. Patient would like to  continue working on this process until a provider is assigned to him to work with.     Needs: NO    Does Patient Have Access to Technology: YES    Patient E-mail Address: [email or web address]    Current Patient Phone Number: recently changed phone number and does not know at this time but is accessible through email    Clinician Gender Preference (if applicable): YES: female    Patient location preference: [place]

## 2023-06-27 NOTE — TELEPHONE ENCOUNTER
Called back to [facility] to speak with patient for TC scheduling.     Reached patient and scheduled initial TC Therapy 7/3/2023.     Taomee link sent to patient.    Tanja Cox  Transition Clinic Coordinator  Date and Time: 06/27/23 10:33 AM

## 2023-06-27 NOTE — ED PROVIDER NOTES
ED Observation Discharge Summary  [facility]  Discharge Date: 6/27/2023    Denilson Colunga MRN: [id number]   Age: 32 year old YOB: 1990     Brief HPI & Initial ED Course     Chief Complaint   Patient presents with     Suicidal     States he had an episode of psychosis over the past 2-3 days, feels better today but is having intrusive suicidal thoughts, denies having a plan     HPI  Denilson Colunga is a 32 year old male with PMH notable for polysubstance use with relapse having being feeling suicidal who presented to the ED with a psychiatric concern. .      The patient was evaluated in the emergency department by a physician and DEC behavioral health . The DEC  recommended observation. See separate DEC note from this encounter for details on the assessment. The patient's psychiatric state was such that he would benefit from ongoing monitoring. Observation care was initiated with the plan including serial assessments of psychiatric condition, potential administration of medications if indicated, and further disposition pending the patient's psychiatric course during the monitoring period.     See ED Observation H&P for further details on the patient's presenting history and initial evaluation.     Physical Exam   BP: 136/78  Pulse: 113  Temp: 99.2  F (37.3  C)  Resp: 16  Weight: 93.4 kg (206 lb)  SpO2: 98 %    Physical Exam  General: . Appears stated age.   HENT: MMM, no oropharyngeal lesions  Eyes: PERRL, normal sclerae   Cardio: Regular rate, extremities well perfused  Resp: Normal work of breathing, normal respiratory rate  Neuro: alert and fully oriented. CN II-XII grossly intact. Grossly normal strength and sensation in all extremities.   MSK: no deformities.   Integumentary/Skin: no rash visualized, normal color  Psych: Patient with good insight at this point is not suicidal homicidal is not agitated displacement withdrawal etc.  Results       Procedures       {       Labs Ordered and Resulted from Time of ED Arrival to Time of ED Departure   DRUG ABUSE SCREEN 1 URINE (ED) - Abnormal       Result Value    Amphetamines Urine Screen Negative      Barbituates Urine Screen Negative      Benzodiazepine Urine Screen Positive (*)     Cannabinoids Urine Screen Negative      Cocaine Urine Screen Positive (*)     Opiates Urine Screen Negative     COMPREHENSIVE METABOLIC PANEL - Abnormal    Sodium 135 (*)     Potassium 3.9      Chloride 99      Carbon Dioxide (CO2) 24      Anion Gap 12      Urea Nitrogen 25.4 (*)     Creatinine 1.13      Calcium 8.8      Glucose 103 (*)     Alkaline Phosphatase 133 (*)     AST 49 (*)     ALT 48      Protein Total 7.0      Albumin 4.3      Bilirubin Total 0.4      GFR Estimate 89     CBC WITH PLATELETS AND DIFFERENTIAL - Abnormal    WBC Count 6.9      RBC Count 4.32 (*)     Hemoglobin 12.3 (*)     Hematocrit 36.9 (*)     MCV 85      MCH 28.5      MCHC 33.3      RDW 13.2      Platelet Count 347      % Neutrophils 58      % Lymphocytes 23      % Monocytes 11      % Eosinophils 6      % Basophils 1      % Immature Granulocytes 1      NRBCs per 100 WBC 0      Absolute Neutrophils 4.0      Absolute Lymphocytes 1.6      Absolute Monocytes 0.8      Absolute Eosinophils 0.4      Absolute Basophils 0.1      Absolute Immature Granulocytes 0.0      Absolute NRBCs 0.0              Observation Course   The patient was found to have a psychiatric condition that would benefit from an observation stay in the emergency department for further psychiatric stabilization and/or coordination of a safe disposition. The plan upon observation admission included serial assessments of psychiatric condition, potential administration of medications if indicated, further disposition pending the patient's psychiatric course during the monitoring period.     Serial assessments of the patient's psychiatric condition were performed. Nursing notes were reviewed.  During the observation period, the patient did not require medications for agitation, and did not require restraints/seclusion for patient and/or provider safety.        After the period in observation care, the patient's circumstances and mental state were safe for outpatient management. After counseling on the diagnosis, work-up, and treatment plan, the patient was discharged. Close follow-up with a psychiatrist and/or therapist was recommended and community psychiatric resources were provided. Patient is to return to the ED if any urgent or potentially life-threatening concerns.    Discharge with staff from sober [place] patient feels safe.      Discharge Diagnoses: back to sober [place] with staff here..  Final diagnoses:   Episode of recurrent major depressive disorder, unspecified depression episode severity (H)   SAVANA (generalized anxiety disorder)   Cocaine use       --  Kai Bach MD  [facility] EMERGENCY DEPARTMENT  6/27/2023     This note was created at least in part by the use of dragon voice dictation system. Inadvertent typographical errors may still exist.  Kai Bach MD.    Patient evaluated in the emergency department during the COVID-19 pandemic period. Careful attention to patients safety was addressed throughout the evaluation. Evaluation and treatment management was initiated with disposition made efficiently and appropriate as possible to minimize any risk of potential exposure to patient during this evaluation.       Kai Bach MD  06/27/23 3307

## 2023-06-27 NOTE — ED NOTES
I received report on this patient and will take over care at 23:25pm.02:46am Patient resting in bed with eyes closed and chest and rise and fall observed.Patient rested all night no issues.

## 2023-11-02 ENCOUNTER — HOSPITAL ENCOUNTER (INPATIENT)
Facility: CLINIC | Age: 33
LOS: 2 days | Discharge: HOME | End: 2023-11-04
Attending: EMERGENCY MEDICINE | Admitting: STUDENT IN AN ORGANIZED HEALTH CARE EDUCATION/TRAINING PROGRAM
Payer: COMMERCIAL

## 2023-11-02 ENCOUNTER — APPOINTMENT (OUTPATIENT)
Dept: GENERAL RADIOLOGY | Facility: CLINIC | Age: 33
End: 2023-11-02
Attending: PHYSICIAN ASSISTANT
Payer: COMMERCIAL

## 2023-11-02 ENCOUNTER — APPOINTMENT (OUTPATIENT)
Dept: GENERAL RADIOLOGY | Facility: CLINIC | Age: 33
End: 2023-11-02
Attending: EMERGENCY MEDICINE
Payer: COMMERCIAL

## 2023-11-02 DIAGNOSIS — J96.22 ACUTE ON CHRONIC RESPIRATORY FAILURE WITH HYPOXIA AND HYPERCAPNIA (H): ICD-10-CM

## 2023-11-02 DIAGNOSIS — A41.9 SYSTEMIC INFECTION (H): Primary | ICD-10-CM

## 2023-11-02 DIAGNOSIS — A41.9 SEPSIS WITH ACUTE HYPOXIC RESPIRATORY FAILURE WITHOUT SEPTIC SHOCK, DUE TO UNSPECIFIED ORGANISM (H): ICD-10-CM

## 2023-11-02 DIAGNOSIS — J96.21 ACUTE ON CHRONIC RESPIRATORY FAILURE WITH HYPOXIA AND HYPERCAPNIA (H): ICD-10-CM

## 2023-11-02 DIAGNOSIS — R65.20 SEPSIS WITH ACUTE HYPOXIC RESPIRATORY FAILURE WITHOUT SEPTIC SHOCK, DUE TO UNSPECIFIED ORGANISM (H): ICD-10-CM

## 2023-11-02 DIAGNOSIS — F17.210 CIGARETTE SMOKER: ICD-10-CM

## 2023-11-02 DIAGNOSIS — J96.01 SEPSIS WITH ACUTE HYPOXIC RESPIRATORY FAILURE WITHOUT SEPTIC SHOCK, DUE TO UNSPECIFIED ORGANISM (H): ICD-10-CM

## 2023-11-02 PROBLEM — K21.9 ESOPHAGEAL REFLUX: Status: ACTIVE | Noted: 2023-11-02

## 2023-11-02 PROBLEM — F19.10 POLYSUBSTANCE ABUSE (H): Chronic | Status: ACTIVE | Noted: 2023-04-13

## 2023-11-02 LAB
ALBUMIN SERPL BCG-MCNC: 5 G/DL (ref 3.5–5.2)
ALP SERPL-CCNC: 103 U/L (ref 40–129)
ALT SERPL W P-5'-P-CCNC: 27 U/L (ref 0–70)
ANION GAP SERPL CALCULATED.3IONS-SCNC: 13 MMOL/L (ref 7–15)
AST SERPL W P-5'-P-CCNC: 36 U/L (ref 0–45)
BASE EXCESS BLDV CALC-SCNC: 1.2 MMOL/L (ref -7.7–1.9)
BASE EXCESS BLDV CALC-SCNC: 2.8 MMOL/L (ref -7.7–1.9)
BASOPHILS # BLD AUTO: 0.1 10E3/UL (ref 0–0.2)
BASOPHILS NFR BLD AUTO: 0 %
BILIRUB SERPL-MCNC: 0.3 MG/DL
BUN SERPL-MCNC: 22.8 MG/DL (ref 6–20)
CALCIUM SERPL-MCNC: 9.8 MG/DL (ref 8.6–10)
CHLORIDE SERPL-SCNC: 99 MMOL/L (ref 98–107)
CREAT SERPL-MCNC: 1.16 MG/DL (ref 0.67–1.17)
DEPRECATED HCO3 PLAS-SCNC: 25 MMOL/L (ref 22–29)
EGFRCR SERPLBLD CKD-EPI 2021: 85 ML/MIN/1.73M2
EOSINOPHIL # BLD AUTO: 0 10E3/UL (ref 0–0.7)
EOSINOPHIL NFR BLD AUTO: 0 %
ERYTHROCYTE [DISTWIDTH] IN BLOOD BY AUTOMATED COUNT: 14.3 % (ref 10–15)
FLUAV RNA SPEC QL NAA+PROBE: NEGATIVE
FLUBV RNA RESP QL NAA+PROBE: NEGATIVE
GLUCOSE SERPL-MCNC: 105 MG/DL (ref 70–99)
HCO3 BLDV-SCNC: 29 MMOL/L (ref 21–28)
HCO3 BLDV-SCNC: 29 MMOL/L (ref 21–28)
HCT VFR BLD AUTO: 37.9 % (ref 40–53)
HGB BLD-MCNC: 11.7 G/DL (ref 13.3–17.7)
HGB BLD-MCNC: 9.6 G/DL (ref 13.3–17.7)
HOLD SPECIMEN: NORMAL
IMM GRANULOCYTES # BLD: 0.1 10E3/UL
IMM GRANULOCYTES NFR BLD: 1 %
LACTATE SERPL-SCNC: 1 MMOL/L (ref 0.7–2)
LACTATE SERPL-SCNC: 1.7 MMOL/L (ref 0.7–2)
LACTATE SERPL-SCNC: 2.1 MMOL/L (ref 0.7–2)
LACTATE SERPL-SCNC: 2.4 MMOL/L (ref 0.7–2)
LYMPHOCYTES # BLD AUTO: 0.7 10E3/UL (ref 0.8–5.3)
LYMPHOCYTES NFR BLD AUTO: 4 %
MCH RBC QN AUTO: 25.5 PG (ref 26.5–33)
MCHC RBC AUTO-ENTMCNC: 30.9 G/DL (ref 31.5–36.5)
MCV RBC AUTO: 83 FL (ref 78–100)
MONOCYTES # BLD AUTO: 1.1 10E3/UL (ref 0–1.3)
MONOCYTES NFR BLD AUTO: 7 %
NEUTROPHILS # BLD AUTO: 13.8 10E3/UL (ref 1.6–8.3)
NEUTROPHILS NFR BLD AUTO: 88 %
NRBC # BLD AUTO: 0 10E3/UL
NRBC BLD AUTO-RTO: 0 /100
O2/TOTAL GAS SETTING VFR VENT: 0 %
O2/TOTAL GAS SETTING VFR VENT: 48 %
PCO2 BLDV: 50 MM HG (ref 40–50)
PCO2 BLDV: 59 MM HG (ref 40–50)
PH BLDV: 7.3 [PH] (ref 7.32–7.43)
PH BLDV: 7.37 [PH] (ref 7.32–7.43)
PLATELET # BLD AUTO: 540 10E3/UL (ref 150–450)
PO2 BLDV: 15 MM HG (ref 25–47)
PO2 BLDV: 34 MM HG (ref 25–47)
POTASSIUM SERPL-SCNC: 5 MMOL/L (ref 3.4–5.3)
PROT SERPL-MCNC: 7.5 G/DL (ref 6.4–8.3)
RBC # BLD AUTO: 4.59 10E6/UL (ref 4.4–5.9)
RSV RNA SPEC NAA+PROBE: NEGATIVE
SARS-COV-2 RNA RESP QL NAA+PROBE: NEGATIVE
SODIUM SERPL-SCNC: 137 MMOL/L (ref 135–145)
WBC # BLD AUTO: 15.7 10E3/UL (ref 4–11)

## 2023-11-02 PROCEDURE — 99291 CRITICAL CARE FIRST HOUR: CPT | Mod: 25 | Performed by: EMERGENCY MEDICINE

## 2023-11-02 PROCEDURE — 80053 COMPREHEN METABOLIC PANEL: CPT | Performed by: EMERGENCY MEDICINE

## 2023-11-02 PROCEDURE — 87040 BLOOD CULTURE FOR BACTERIA: CPT | Performed by: EMERGENCY MEDICINE

## 2023-11-02 PROCEDURE — 250N000011 HC RX IP 250 OP 636: Performed by: PHYSICIAN ASSISTANT

## 2023-11-02 PROCEDURE — 71046 X-RAY EXAM CHEST 2 VIEWS: CPT

## 2023-11-02 PROCEDURE — 83605 ASSAY OF LACTIC ACID: CPT | Performed by: PHYSICIAN ASSISTANT

## 2023-11-02 PROCEDURE — 87205 SMEAR GRAM STAIN: CPT | Performed by: EMERGENCY MEDICINE

## 2023-11-02 PROCEDURE — 999N000157 HC STATISTIC RCP TIME EA 10 MIN

## 2023-11-02 PROCEDURE — C9113 INJ PANTOPRAZOLE SODIUM, VIA: HCPCS | Performed by: PHYSICIAN ASSISTANT

## 2023-11-02 PROCEDURE — 250N000013 HC RX MED GY IP 250 OP 250 PS 637: Performed by: PHYSICIAN ASSISTANT

## 2023-11-02 PROCEDURE — 250N000013 HC RX MED GY IP 250 OP 250 PS 637: Performed by: EMERGENCY MEDICINE

## 2023-11-02 PROCEDURE — 93005 ELECTROCARDIOGRAM TRACING: CPT | Performed by: EMERGENCY MEDICINE

## 2023-11-02 PROCEDURE — 87077 CULTURE AEROBIC IDENTIFY: CPT | Performed by: EMERGENCY MEDICINE

## 2023-11-02 PROCEDURE — 250N000009 HC RX 250: Performed by: EMERGENCY MEDICINE

## 2023-11-02 PROCEDURE — 250N000011 HC RX IP 250 OP 636: Mod: JZ | Performed by: EMERGENCY MEDICINE

## 2023-11-02 PROCEDURE — 250N000009 HC RX 250: Performed by: PHYSICIAN ASSISTANT

## 2023-11-02 PROCEDURE — 82803 BLOOD GASES ANY COMBINATION: CPT | Performed by: PHYSICIAN ASSISTANT

## 2023-11-02 PROCEDURE — 93010 ELECTROCARDIOGRAM REPORT: CPT | Performed by: EMERGENCY MEDICINE

## 2023-11-02 PROCEDURE — 87637 SARSCOV2&INF A&B&RSV AMP PRB: CPT | Performed by: EMERGENCY MEDICINE

## 2023-11-02 PROCEDURE — 85025 COMPLETE CBC W/AUTO DIFF WBC: CPT | Performed by: EMERGENCY MEDICINE

## 2023-11-02 PROCEDURE — 83605 ASSAY OF LACTIC ACID: CPT | Performed by: EMERGENCY MEDICINE

## 2023-11-02 PROCEDURE — 36415 COLL VENOUS BLD VENIPUNCTURE: CPT | Performed by: EMERGENCY MEDICINE

## 2023-11-02 PROCEDURE — 96361 HYDRATE IV INFUSION ADD-ON: CPT | Performed by: EMERGENCY MEDICINE

## 2023-11-02 PROCEDURE — 99223 1ST HOSP IP/OBS HIGH 75: CPT | Mod: AI | Performed by: PHYSICIAN ASSISTANT

## 2023-11-02 PROCEDURE — 82803 BLOOD GASES ANY COMBINATION: CPT | Performed by: EMERGENCY MEDICINE

## 2023-11-02 PROCEDURE — 85018 HEMOGLOBIN: CPT | Performed by: PHYSICIAN ASSISTANT

## 2023-11-02 PROCEDURE — 120N000001 HC R&B MED SURG/OB

## 2023-11-02 PROCEDURE — 71045 X-RAY EXAM CHEST 1 VIEW: CPT

## 2023-11-02 PROCEDURE — 96365 THER/PROPH/DIAG IV INF INIT: CPT | Performed by: EMERGENCY MEDICINE

## 2023-11-02 PROCEDURE — 36415 COLL VENOUS BLD VENIPUNCTURE: CPT | Performed by: PHYSICIAN ASSISTANT

## 2023-11-02 PROCEDURE — 94640 AIRWAY INHALATION TREATMENT: CPT

## 2023-11-02 PROCEDURE — 258N000003 HC RX IP 258 OP 636: Performed by: EMERGENCY MEDICINE

## 2023-11-02 PROCEDURE — 258N000003 HC RX IP 258 OP 636: Performed by: PHYSICIAN ASSISTANT

## 2023-11-02 RX ORDER — BUPRENORPHINE AND NALOXONE 8; 2 MG/1; MG/1
1 FILM, SOLUBLE BUCCAL; SUBLINGUAL EVERY MORNING
Status: DISCONTINUED | OUTPATIENT
Start: 2023-11-03 | End: 2023-11-03

## 2023-11-02 RX ORDER — ACETAMINOPHEN 500 MG
1000 TABLET ORAL ONCE
Status: COMPLETED | OUTPATIENT
Start: 2023-11-02 | End: 2023-11-02

## 2023-11-02 RX ORDER — CEFTRIAXONE 2 G/1
2 INJECTION, POWDER, FOR SOLUTION INTRAMUSCULAR; INTRAVENOUS EVERY 24 HOURS
Status: DISCONTINUED | OUTPATIENT
Start: 2023-11-03 | End: 2023-11-03

## 2023-11-02 RX ORDER — IPRATROPIUM BROMIDE AND ALBUTEROL SULFATE 2.5; .5 MG/3ML; MG/3ML
3 SOLUTION RESPIRATORY (INHALATION)
Status: DISCONTINUED | OUTPATIENT
Start: 2023-11-02 | End: 2023-11-02

## 2023-11-02 RX ORDER — GABAPENTIN 400 MG/1
800 CAPSULE ORAL 3 TIMES DAILY
Status: DISCONTINUED | OUTPATIENT
Start: 2023-11-02 | End: 2023-11-04 | Stop reason: HOSPADM

## 2023-11-02 RX ORDER — ONDANSETRON 4 MG/1
4 TABLET, ORALLY DISINTEGRATING ORAL EVERY 6 HOURS PRN
Status: DISCONTINUED | OUTPATIENT
Start: 2023-11-02 | End: 2023-11-04 | Stop reason: HOSPADM

## 2023-11-02 RX ORDER — BUPRENORPHINE AND NALOXONE 4; 1 MG/1; MG/1
1 FILM, SOLUBLE BUCCAL; SUBLINGUAL 2 TIMES DAILY
Status: DISCONTINUED | OUTPATIENT
Start: 2023-11-02 | End: 2023-11-03

## 2023-11-02 RX ORDER — SODIUM CHLORIDE, SODIUM LACTATE, POTASSIUM CHLORIDE, CALCIUM CHLORIDE 600; 310; 30; 20 MG/100ML; MG/100ML; MG/100ML; MG/100ML
INJECTION, SOLUTION INTRAVENOUS CONTINUOUS
Status: DISCONTINUED | OUTPATIENT
Start: 2023-11-02 | End: 2023-11-02

## 2023-11-02 RX ORDER — PROCHLORPERAZINE 25 MG
25 SUPPOSITORY, RECTAL RECTAL EVERY 12 HOURS PRN
Status: DISCONTINUED | OUTPATIENT
Start: 2023-11-02 | End: 2023-11-04 | Stop reason: HOSPADM

## 2023-11-02 RX ORDER — BUSPIRONE HYDROCHLORIDE 15 MG/1
30 TABLET ORAL 2 TIMES DAILY
Status: DISCONTINUED | OUTPATIENT
Start: 2023-11-02 | End: 2023-11-04 | Stop reason: HOSPADM

## 2023-11-02 RX ORDER — VENLAFAXINE HYDROCHLORIDE 150 MG/1
300 CAPSULE, EXTENDED RELEASE ORAL DAILY
Status: DISCONTINUED | OUTPATIENT
Start: 2023-11-03 | End: 2023-11-04 | Stop reason: HOSPADM

## 2023-11-02 RX ORDER — ONDANSETRON 2 MG/ML
4 INJECTION INTRAMUSCULAR; INTRAVENOUS EVERY 6 HOURS PRN
Status: DISCONTINUED | OUTPATIENT
Start: 2023-11-02 | End: 2023-11-04 | Stop reason: HOSPADM

## 2023-11-02 RX ORDER — CEFTRIAXONE 2 G/1
2 INJECTION, POWDER, FOR SOLUTION INTRAMUSCULAR; INTRAVENOUS ONCE
Status: COMPLETED | OUTPATIENT
Start: 2023-11-02 | End: 2023-11-02

## 2023-11-02 RX ORDER — IPRATROPIUM BROMIDE AND ALBUTEROL SULFATE 2.5; .5 MG/3ML; MG/3ML
3 SOLUTION RESPIRATORY (INHALATION)
Status: DISCONTINUED | OUTPATIENT
Start: 2023-11-03 | End: 2023-11-04 | Stop reason: HOSPADM

## 2023-11-02 RX ORDER — ACETAMINOPHEN 325 MG/1
650 TABLET ORAL EVERY 4 HOURS PRN
Status: DISCONTINUED | OUTPATIENT
Start: 2023-11-02 | End: 2023-11-03

## 2023-11-02 RX ORDER — IPRATROPIUM BROMIDE AND ALBUTEROL SULFATE 2.5; .5 MG/3ML; MG/3ML
3 SOLUTION RESPIRATORY (INHALATION) ONCE
Status: COMPLETED | OUTPATIENT
Start: 2023-11-02 | End: 2023-11-02

## 2023-11-02 RX ORDER — SODIUM CHLORIDE, SODIUM LACTATE, POTASSIUM CHLORIDE, CALCIUM CHLORIDE 600; 310; 30; 20 MG/100ML; MG/100ML; MG/100ML; MG/100ML
INJECTION, SOLUTION INTRAVENOUS CONTINUOUS
Status: DISCONTINUED | OUTPATIENT
Start: 2023-11-02 | End: 2023-11-03

## 2023-11-02 RX ORDER — AZITHROMYCIN 250 MG/1
500 TABLET, FILM COATED ORAL ONCE
Status: COMPLETED | OUTPATIENT
Start: 2023-11-02 | End: 2023-11-02

## 2023-11-02 RX ORDER — ACETAMINOPHEN 325 MG/1
650 TABLET ORAL EVERY 6 HOURS PRN
Status: DISCONTINUED | OUTPATIENT
Start: 2023-11-02 | End: 2023-11-04 | Stop reason: HOSPADM

## 2023-11-02 RX ORDER — PROCHLORPERAZINE MALEATE 10 MG
10 TABLET ORAL EVERY 6 HOURS PRN
Status: DISCONTINUED | OUTPATIENT
Start: 2023-11-02 | End: 2023-11-04 | Stop reason: HOSPADM

## 2023-11-02 RX ORDER — IBUPROFEN 200 MG
2 TABLET ORAL EVERY 4 HOURS PRN
COMMUNITY

## 2023-11-02 RX ORDER — ONDANSETRON 4 MG/1
4 TABLET, ORALLY DISINTEGRATING ORAL EVERY 6 HOURS PRN
Status: DISCONTINUED | OUTPATIENT
Start: 2023-11-02 | End: 2023-11-02

## 2023-11-02 RX ORDER — ONDANSETRON 2 MG/ML
4 INJECTION INTRAMUSCULAR; INTRAVENOUS EVERY 6 HOURS PRN
Status: DISCONTINUED | OUTPATIENT
Start: 2023-11-02 | End: 2023-11-02

## 2023-11-02 RX ORDER — GABAPENTIN 800 MG/1
1 TABLET ORAL 3 TIMES DAILY
COMMUNITY
Start: 2023-10-30

## 2023-11-02 RX ORDER — LIDOCAINE 40 MG/G
CREAM TOPICAL
Status: DISCONTINUED | OUTPATIENT
Start: 2023-11-02 | End: 2023-11-04 | Stop reason: HOSPADM

## 2023-11-02 RX ADMIN — AZITHROMYCIN DIHYDRATE 500 MG: 250 TABLET, FILM COATED ORAL at 15:47

## 2023-11-02 RX ADMIN — CEFTRIAXONE SODIUM 2 G: 2 INJECTION, POWDER, FOR SOLUTION INTRAMUSCULAR; INTRAVENOUS at 14:30

## 2023-11-02 RX ADMIN — SODIUM CHLORIDE, POTASSIUM CHLORIDE, SODIUM LACTATE AND CALCIUM CHLORIDE 1500 ML: 600; 310; 30; 20 INJECTION, SOLUTION INTRAVENOUS at 19:46

## 2023-11-02 RX ADMIN — PANTOPRAZOLE SODIUM 40 MG: 40 INJECTION, POWDER, LYOPHILIZED, FOR SOLUTION INTRAVENOUS at 22:11

## 2023-11-02 RX ADMIN — ACETAMINOPHEN 1000 MG: 500 TABLET, FILM COATED ORAL at 14:30

## 2023-11-02 RX ADMIN — IPRATROPIUM BROMIDE AND ALBUTEROL SULFATE 3 ML: 2.5; .5 SOLUTION RESPIRATORY (INHALATION) at 22:50

## 2023-11-02 RX ADMIN — SODIUM CHLORIDE 1000 ML: 9 INJECTION, SOLUTION INTRAVENOUS at 14:31

## 2023-11-02 RX ADMIN — SODIUM CHLORIDE, POTASSIUM CHLORIDE, SODIUM LACTATE AND CALCIUM CHLORIDE: 600; 310; 30; 20 INJECTION, SOLUTION INTRAVENOUS at 23:55

## 2023-11-02 RX ADMIN — GABAPENTIN 800 MG: 400 CAPSULE ORAL at 22:11

## 2023-11-02 RX ADMIN — SODIUM CHLORIDE, POTASSIUM CHLORIDE, SODIUM LACTATE AND CALCIUM CHLORIDE: 600; 310; 30; 20 INJECTION, SOLUTION INTRAVENOUS at 23:21

## 2023-11-02 RX ADMIN — BUSPIRONE HYDROCHLORIDE 30 MG: 15 TABLET ORAL at 22:11

## 2023-11-02 RX ADMIN — IPRATROPIUM BROMIDE AND ALBUTEROL SULFATE 3 ML: .5; 3 SOLUTION RESPIRATORY (INHALATION) at 14:28

## 2023-11-02 ASSESSMENT — ACTIVITIES OF DAILY LIVING (ADL)
ADLS_ACUITY_SCORE: 35
ADLS_ACUITY_SCORE: 20
ADLS_ACUITY_SCORE: 35
ADLS_ACUITY_SCORE: 20
ADLS_ACUITY_SCORE: 35

## 2023-11-02 NOTE — PROGRESS NOTES
Reviewing chart due to high probability of admit to Med/Surg unit.     Constantin De Leon RN on 11/2/2023 at 4:23 PM

## 2023-11-02 NOTE — ED NOTES
"[facility]   Admission Handoff    The patient is Denilson Colunga, 33 year old who arrived in the ED by WALKED from home with a complaint of Cough (Cough, black liquid emesis. \" I believe that he aspirated today\" Confused per Mom. Temp in triage 102.4)  . The patient's current symptoms are new and during this time the symptoms have decreased. In the ED, patient was diagnosed with   Final diagnoses:   Sepsis with acute hypoxic respiratory failure without septic shock, due to unspecified organism (H)   Acute on chronic respiratory failure with hypoxia and hypercapnia (H)         Needed?: No    Allergies:  No Known Allergies    Past Medical Hx:   Past Medical History:   Diagnosis Date    Anxiety        Initial vitals were: BP: 99/54  Pulse: 120  Temp: (!) 102.4  F (39.1  C)  Resp: 20  Height: 182.9 cm (6')  Weight: 85.7 kg (189 lb)  SpO2: (!) 85 %   Recent vital Signs: /82   Pulse (!) 121   Temp (!) 102.4  F (39.1  C) (Tympanic)   Resp 22   Ht 1.829 m (6')   Wt 85.7 kg (189 lb)   SpO2 92%   BMI 25.63 kg/m      Elimination Status: Continent: Yes     Activity Level: Independent    Fall Status: Reason for falls risk:  Mobility  patient and family education and activity supervised    Baseline Mental status: WDL  Current Mental Status changes: at basesline    Infection present or suspected this encounter: yes respiratory and cultures pending  Sepsis suspected: Yes    Isolation type: NA    Bariatric equipment needed?: No    In the ED these meds were given:   Medications   cefTRIAXone (ROCEPHIN) 2 g vial to attach to  ml bag for ADULTS or NS 50 ml bag for PEDS (0 g Intravenous Stopped 11/2/23 1500)   sodium chloride 0.9% BOLUS 1,000 mL (0 mLs Intravenous Stopped 11/2/23 1817)   acetaminophen (TYLENOL) tablet 1,000 mg (1,000 mg Oral $Given 11/2/23 1430)   ipratropium - albuterol 0.5 mg/2.5 mg/3 mL (DUONEB) neb solution 3 mL (3 mLs Nebulization $Given 11/2/23 1428) "   azithromycin (ZITHROMAX) tablet 500 mg (500 mg Oral $Given 11/2/23 3107)       Drips running?  No    Home pump  No    Current LDAs: Peripheral IV: Site L AC; Gauge 20  none     Results:   Labs/Imaging  Ordered and Resulted from Time of ED Arrival Up to the Time of Departure from the ED  Results for orders placed or performed during the hospital encounter of 11/02/23 (from the past 24 hour(s))   CBC with platelets, differential    Narrative    The following orders were created for panel order CBC with platelets, differential.  Procedure                               Abnormality         Status                     ---------                               -----------         ------                     CBC with platelets and d...[[id number]]  Abnormal            Final result                 Please view results for these tests on the individual orders.   Comprehensive metabolic panel   Result Value Ref Range    Sodium 137 135 - 145 mmol/L    Potassium 5.0 3.4 - 5.3 mmol/L    Carbon Dioxide (CO2) 25 22 - 29 mmol/L    Anion Gap 13 7 - 15 mmol/L    Urea Nitrogen 22.8 (H) 6.0 - 20.0 mg/dL    Creatinine 1.16 0.67 - 1.17 mg/dL    GFR Estimate 85 >60 mL/min/1.73m2    Calcium 9.8 8.6 - 10.0 mg/dL    Chloride 99 98 - 107 mmol/L    Glucose 105 (H) 70 - 99 mg/dL    Alkaline Phosphatase 103 40 - 129 U/L    AST 36 0 - 45 U/L    ALT 27 0 - 70 U/L    Protein Total 7.5 6.4 - 8.3 g/dL    Albumin 5.0 3.5 - 5.2 g/dL    Bilirubin Total 0.3 <=1.2 mg/dL   Lactic acid whole blood   Result Value Ref Range    Lactic Acid 2.4 (H) 0.7 - 2.0 mmol/L   Blood gas venous   Result Value Ref Range    pH Venous 7.30 (L) 7.32 - 7.43    pCO2 Venous 59 (H) 40 - 50 mm Hg    pO2 Venous 15 (L) 25 - 47 mm Hg    Bicarbonate Venous 29 (H) 21 - 28 mmol/L    Base Excess/Deficit 1.2 -7.7 - 1.9 mmol/L    FIO2 0    [place] Draw    Narrative    The following orders were created for panel order [place] Draw.  Procedure                               Abnormality          Status                     ---------                               -----------         ------                     Extra Blood Culture Bottle[[id number]]                       Final result               Extra Blue Top Tube[[id number]]                              Final result               Extra Red Top Tube[[id number]]                               Final result               Extra Purple Top Tube[[id number]]                            Final result                 Please view results for these tests on the individual orders.   CBC with platelets and differential   Result Value Ref Range    WBC Count 15.7 (H) 4.0 - 11.0 10e3/uL    RBC Count 4.59 4.40 - 5.90 10e6/uL    Hemoglobin 11.7 (L) 13.3 - 17.7 g/dL    Hematocrit 37.9 (L) 40.0 - 53.0 %    MCV 83 78 - 100 fL    MCH 25.5 (L) 26.5 - 33.0 pg    MCHC 30.9 (L) 31.5 - 36.5 g/dL    RDW 14.3 10.0 - 15.0 %    Platelet Count 540 (H) 150 - 450 10e3/uL    % Neutrophils 88 %    % Lymphocytes 4 %    % Monocytes 7 %    % Eosinophils 0 %    % Basophils 0 %    % Immature Granulocytes 1 %    NRBCs per 100 WBC 0 <1 /100    Absolute Neutrophils 13.8 (H) 1.6 - 8.3 10e3/uL    Absolute Lymphocytes 0.7 (L) 0.8 - 5.3 10e3/uL    Absolute Monocytes 1.1 0.0 - 1.3 10e3/uL    Absolute Eosinophils 0.0 0.0 - 0.7 10e3/uL    Absolute Basophils 0.1 0.0 - 0.2 10e3/uL    Absolute Immature Granulocytes 0.1 <=0.4 10e3/uL    Absolute NRBCs 0.0 10e3/uL   Extra Blood Culture Bottle   Result Value Ref Range    Hold Specimen JIC    Extra Blue Top Tube   Result Value Ref Range    Hold Specimen JIC    Extra Red Top Tube   Result Value Ref Range    Hold Specimen JIC    Extra Purple Top Tube   Result Value Ref Range    Hold Specimen JIC    Chest XR,  PA & LAT    Narrative    CHEST TWO VIEWS   11/2/2023 2:48 PM     HISTORY: Sepsis w/ likely pulmonary source. Concern for aspiration on  right, also with bronchospasm.    COMPARISON: 6/29/2020.      Impression    IMPRESSION: No acute cardiopulmonary disease.    ALFONSO  MD MICHAEL         SYSTEM ID:  [id number]   Symptomatic Influenza A/B, RSV, & SARS-CoV2 PCR (COVID-19) Nose    Specimen: Nose; Swab   Result Value Ref Range    Influenza A PCR Negative Negative    Influenza B PCR Negative Negative    RSV PCR Negative Negative    SARS CoV2 PCR Negative Negative    Narrative    Testing was performed using the Xpert Xpress CoV2/Flu/RSV Assay on the [employer] GeneXpert Instrument. This test should be ordered for the detection of SARS-CoV-2, influenza, and RSV viruses in individuals who meet clinical and/or epidemiological criteria. Test performance is unknown in asymptomatic patients. This test is for in vitro diagnostic use under the FDA EUA for laboratories certified under CLIA to perform high or moderate complexity testing. This test has not been FDA cleared or approved. A negative result does not rule out the presence of PCR inhibitors in the specimen or target RNA in concentration below the limit of detection for the assay. If only one viral target is positive but coinfection with multiple targets is suspected, the sample should be re-tested with another FDA cleared, approved, or authorized test, if coinfection would change clinical management. This test was validated by the [facility] [employer]. These laboratories are certified under the Clinical Laboratory Improvement Amendments of 1988 (CLIA-88) as qualified to perform high complexity laboratory testing.   Lactic acid whole blood   Result Value Ref Range    Lactic Acid 2.1 (H) 0.7 - 2.0 mmol/L       For the majority of the shift this patient's behavior was Green     Cardiac Rhythm: Tachycardia  Pt needs tele? Yes  Skin/wound Issues: None    Code Status: Full Code    Pain control: pt had none    Nausea control: fair    Abnormal labs/tests/findings requiring intervention: lactic elevated    Patient tested for COVID 19 prior to admission: YES     OBS brochure/video discussed/provided to patient/family: N/A     Family present  during ED course? Yes     Family Comments/Social Situation comments: lives home with mom    Tasks needing completion: None    Pat Fung, RN

## 2023-11-02 NOTE — ED PROVIDER NOTES
"  History     Chief Complaint   Patient presents with    Cough     Cough, black liquid emesis. \" I believe that he aspirated today\" Confused per Mom. Temp in triage 102.4     HPI  Denilson Colunga is a 33 year old male with history of polysubstance abuse, anxiety, depression, woke with cough and shortness of breath.  Concerns that he may have aspirated in his sleep.  Vomited thick black liquid.  Was feeling well yesterday.  Symptoms started when he woke late this morning.  Frequent coughing.  Denies any chest pain.  Says his sobriety has been going well and last relapse 2 weeks ago.  Is on Suboxone, gabapentin, venlafaxine, bupropion, and Remeron.  States for the last week he has been having sleep disturbances where he wakes up and talks with night terrors and possible sleepwalking.  Has been on Remeron for 1 to 2 months which he takes prior to bed.  No history of asthma.  Uses alcohol, marijuana, cocaine, opiates.  Denies any history of intravenous drug use    The patient's PMHx, Surgical Hx, Allergies, and Medications were all reviewed with the patient.    Allergies:  No Known Allergies    Problem List:    Patient Active Problem List    Diagnosis Date Noted    SAVANA (generalized anxiety disorder) 06/23/2023     Priority: Medium    Episode of recurrent major depressive disorder, unspecified depression episode severity (H24) 06/23/2023     Priority: Medium    Polysubstance abuse (H) 04/13/2023     Priority: Medium    Cannabis use disorder, severe, dependence (H) 06/29/2019     Priority: Medium    Heroin use disorder, severe (H) 06/26/2019     Priority: Medium    Major depression in complete remission (H) 06/26/2019     Priority: Medium    Acne 04/28/2011     Priority: Medium    Generalized anxiety disorder 04/28/2011     Priority: Medium     Patient currently not taking medications . Savana 7  Is 7 today.      CARDIOVASCULAR SCREENING; LDL GOAL LESS THAN 160 10/31/2010     Priority: Medium        Past Medical History:  "   Past Medical History:   Diagnosis Date    Anxiety        Past Surgical History:    Past Surgical History:   Procedure Laterality Date    NO HISTORY OF SURGERY         Family History:    Family History   Problem Relation Age of Onset    Depression Mother     Neurologic Disorder Mother         migraine    Thyroid Disease Father     Neurologic Disorder Maternal Grandmother         migraine    Depression Maternal Grandmother     Diabetes Maternal Grandmother     Hypertension Maternal Grandmother     Heart Disease Maternal Grandfather     Heart Disease Paternal Grandmother     Cerebrovascular Disease Paternal Grandmother     Alzheimer Disease Paternal Grandfather     Neurologic Disorder Sister         migraine       Social History:  Marital Status:  Single [1]  Social History     Tobacco Use    Smoking status: Every Day     Packs/day: .5     Types: Cigarettes     Last attempt to quit: 2010     Years since quittin.5    Smokeless tobacco: Never   Substance Use Topics    Alcohol use: Yes     Comment: socially    Drug use: Yes     Types: Marijuana     Comment: marijuana        Medications:    buprenorphine HCl-naloxone HCl (SUBOXONE) 8-2 MG per film  busPIRone HCl (BUSPAR) 30 MG tablet  gabapentin (NEURONTIN) 600 MG tablet  hydrOXYzine (ATARAX) 25 MG tablet  ibuprofen (ADVIL/MOTRIN) 400 MG tablet  mirtazapine (REMERON) 15 MG tablet  venlafaxine (EFFEXOR XR) 150 MG 24 hr capsule          Review of Systems  Pertinent positives and negatives mentioned in HPI    Physical Exam   BP: 99/54  Pulse: 120  Temp: (!) 102.4  F (39.1  C)  Resp: 20  Height: 182.9 cm (6')  Weight: 85.7 kg (189 lb)  SpO2: (!) 85 %    Physical Exam  GEN: Acutely distressed   HENT: MMM. External ears and nose normal bilaterally.  EYES: EOM intact. Conjunctiva clear. No discharge.   NECK: Symmetric, freely mobile.   CV : Tachycardic rate.  Regular rhythm.  No murmurs appreciated  PULM: Increased work of breathing.  Diffuse rhonchorous sounds in  right lung field posteriorly.  Diffuse prolongation of expiratory phase and expiratory wheezing.   ABD: Soft, non-tender, non-distended. No rebound or guarding.   NEURO: Normal speech. Following commands. CN II-XII grossly intact. Answering questions and interacting appropriately.   EXT: No gross deformity. Warm and well perfused.  INT: Warm. No diaphoresis. Normal color.  No Osler nodes, splinter hemorrhages or track marks in antecubital fossa    ED Course        Procedures       EKG: interpreted by myself.  Sinus rhythm with rate of 122 bpm.  Normal axis.  Normal R wave progression.  Normal intervals.  Baseline variability and limb leads.  No ST segment elevations or depressions.  Impression sinus tachycardia.            Critical Care time:  was 45 minutes for this patient excluding procedures.     The patient has signs of Severe Sepsis        If one the following conditions is present, a 30 mL/kg bolus is recommended as part of the 6 hour bundle (IBW can be used for BMI >30, or document refusal/contraindication):      1.   Initial hypotension  defined as 2 bps < 90 or map < 65 in the 6hrs before or 3hrs after time zero.     2.  Lactate >4.      The patient has signs of Severe Sepsis as evidenced by:    1. 2 SIRS criteria, AND  2. Suspected infection, AND   3. Organ dysfunction: Lactic Acidosis with value >2.0    Time severe sepsis diagnosis confirmed: 2:20 PM   11/02/23 as this was the time when Lactate resulted, and the level was > 2.0    3 Hour Severe Sepsis Bundle Completion:    1. Initial Lactic Acid Result:   Recent Labs   Lab Test 11/02/23  1347   LACT 2.4*     2. Blood Cultures before Antibiotics: Yes  3. Broad Spectrum Antibiotics Administered:  yes       Anti-infectives (From admission through now)      Start     Dose/Rate Route Frequency Ordered Stop    11/02/23 1455  azithromycin (ZITHROMAX) tablet 500 mg         500 mg Oral ONCE 11/02/23 1453      11/02/23 1410  cefTRIAXone (ROCEPHIN) 2 g vial to  attach to  ml bag for ADULTS or NS 50 ml bag for PEDS         2 g  over 30 Minutes Intravenous ONCE 11/02/23 1407 11/02/23 1500            4. Is initial hypotension present?     No (IV fluid bolus NOT required). IV Fluid volume administered:     2.1                Severe Sepsis reassessment:  1. Repeat Lactic Acid Level within 6 hours of time zero: 2.1  2. MAP>65 after initial IVF bolus, will continue to monitor fluid status and vital signs              Results for orders placed or performed during the hospital encounter of 11/02/23 (from the past 24 hour(s))   CBC with platelets, differential    Narrative    The following orders were created for panel order CBC with platelets, differential.  Procedure                               Abnormality         Status                     ---------                               -----------         ------                     CBC with platelets and d...[[id number]]  Abnormal            Final result                 Please view results for these tests on the individual orders.   Comprehensive metabolic panel   Result Value Ref Range    Sodium 137 135 - 145 mmol/L    Potassium 5.0 3.4 - 5.3 mmol/L    Carbon Dioxide (CO2) 25 22 - 29 mmol/L    Anion Gap 13 7 - 15 mmol/L    Urea Nitrogen 22.8 (H) 6.0 - 20.0 mg/dL    Creatinine 1.16 0.67 - 1.17 mg/dL    GFR Estimate 85 >60 mL/min/1.73m2    Calcium 9.8 8.6 - 10.0 mg/dL    Chloride 99 98 - 107 mmol/L    Glucose 105 (H) 70 - 99 mg/dL    Alkaline Phosphatase 103 40 - 129 U/L    AST 36 0 - 45 U/L    ALT 27 0 - 70 U/L    Protein Total 7.5 6.4 - 8.3 g/dL    Albumin 5.0 3.5 - 5.2 g/dL    Bilirubin Total 0.3 <=1.2 mg/dL   Lactic acid whole blood   Result Value Ref Range    Lactic Acid 2.4 (H) 0.7 - 2.0 mmol/L   Blood gas venous   Result Value Ref Range    pH Venous 7.30 (L) 7.32 - 7.43    pCO2 Venous 59 (H) 40 - 50 mm Hg    pO2 Venous 15 (L) 25 - 47 mm Hg    Bicarbonate Venous 29 (H) 21 - 28 mmol/L    Base Excess/Deficit 1.2 -7.7 - 1.9  mmol/L    FIO2 0    [facility] Draw    Narrative    The following orders were created for panel order [facility] Draw.  Procedure                               Abnormality         Status                     ---------                               -----------         ------                     Extra Blood Culture Bottle[[id number]]                       Final result               Extra Blue Top Tube[[id number]]                              Final result               Extra Red Top Tube[[id number]]                               Final result               Extra Purple Top Tube[[id number]]                            Final result                 Please view results for these tests on the individual orders.   CBC with platelets and differential   Result Value Ref Range    WBC Count 15.7 (H) 4.0 - 11.0 10e3/uL    RBC Count 4.59 4.40 - 5.90 10e6/uL    Hemoglobin 11.7 (L) 13.3 - 17.7 g/dL    Hematocrit 37.9 (L) 40.0 - 53.0 %    MCV 83 78 - 100 fL    MCH 25.5 (L) 26.5 - 33.0 pg    MCHC 30.9 (L) 31.5 - 36.5 g/dL    RDW 14.3 10.0 - 15.0 %    Platelet Count 540 (H) 150 - 450 10e3/uL    % Neutrophils 88 %    % Lymphocytes 4 %    % Monocytes 7 %    % Eosinophils 0 %    % Basophils 0 %    % Immature Granulocytes 1 %    NRBCs per 100 WBC 0 <1 /100    Absolute Neutrophils 13.8 (H) 1.6 - 8.3 10e3/uL    Absolute Lymphocytes 0.7 (L) 0.8 - 5.3 10e3/uL    Absolute Monocytes 1.1 0.0 - 1.3 10e3/uL    Absolute Eosinophils 0.0 0.0 - 0.7 10e3/uL    Absolute Basophils 0.1 0.0 - 0.2 10e3/uL    Absolute Immature Granulocytes 0.1 <=0.4 10e3/uL    Absolute NRBCs 0.0 10e3/uL   Extra Blood Culture Bottle   Result Value Ref Range    Hold Specimen JIC    Extra Blue Top Tube   Result Value Ref Range    Hold Specimen JIC    Extra Red Top Tube   Result Value Ref Range    Hold Specimen JIC    Extra Purple Top Tube   Result Value Ref Range    Hold Specimen JIC    Chest XR,  PA & LAT    Narrative    CHEST TWO VIEWS   11/2/2023 2:48 PM     HISTORY: Sepsis w/ likely  pulmonary source. Concern for aspiration on  right, also with bronchospasm.    COMPARISON: 6/29/2020.      Impression    IMPRESSION: No acute cardiopulmonary disease.       Medications   azithromycin (ZITHROMAX) tablet 500 mg (has no administration in time range)   cefTRIAXone (ROCEPHIN) 2 g vial to attach to  ml bag for ADULTS or NS 50 ml bag for PEDS (2 g Intravenous $New Bag 11/2/23 1430)   sodium chloride 0.9% BOLUS 1,000 mL (1,000 mLs Intravenous $New Bag 11/2/23 1431)   acetaminophen (TYLENOL) tablet 1,000 mg (1,000 mg Oral $Given 11/2/23 1430)   ipratropium - albuterol 0.5 mg/2.5 mg/3 mL (DUONEB) neb solution 3 mL (3 mLs Nebulization $Given 11/2/23 1424)       Assessments & Plan (with Medical Decision Making)   33 year old male with past medical history of polysubstance abuse who awoke with black liquid emesis and difficulty breathing, fever and frequent cough.  Concerned that he may have aspirated.  On exam he appears acutely distressed.  Is hypoxic and tachypneic.  Temperature 102.4.  Heart rate in the 130s.  On my eval 92% SPO2 on 2 L of supplemental oxygen.  Did have desaturation into the low to mid 80s for a few minutes.  Was able to clear with cough with return of appropriate oxygen saturations.  Initial labs with leukocytosis of 15.7 with left shift.  Hemoglobin 1.7 which is similar to 4 months ago at 12.3.  Venous blood gas with pH of 7.3, PCO2 of 59 and PO2 of 15.  And respiratory acidosis, hypercarbia and hypoxia.  Lactic acid elevated 2.4.  Apparently start treatment with 2 g IV ceftriaxone.  Blood cultures and sputum cultures pending.  Chest x-ray without acute infiltrate, effusion or pneumothorax.  I did review the images personally and question subtle infiltrate in the right lower lobe.  Patient had slight increase of wheezing with DuoNeb but not resolution.  Was still tachycardic.  EKG obtained and sinus tachycardia.  Plan [facility] admission [facility] at [facility].  Case discussed with the hospitalist  service who excepted transfer of care.  Plan to admit to floor.  Still on 3 L supplemental oxygen with adequate oxygen saturation.  Concern for aspiration pneumonia versus pneumonitis versus other infectious process.  SARS-CoV-2/influenza testing negative. Third blood culture obtained, low suspicion for endocarditis but possible.          I have reviewed the nursing notes.         New Prescriptions    No medications on file       Final diagnoses:   Sepsis with acute hypoxic respiratory failure without septic shock, due to unspecified organism (H)   Acute on chronic respiratory failure with hypoxia and hypercapnia (H)     Jeet León MD        11/2/2023   [facility] EMERGENCY DEPT    Disclaimer: This note consists of words and symbols derived from keyboarding and dictation using voice recognition software.  As a result, there may be errors that have gone undetected.  Please consider this when interpreting information found in this note.               Jeet León MD  11/02/23 1268

## 2023-11-02 NOTE — ED TRIAGE NOTES
"  Cough, black liquid emesis. \" I believe that he aspirated today\" Confused per Mom. Temp in triage 102.4   Triage Assessment (Adult)       Row Name 11/02/23 1334          Triage Assessment    Airway WDL WDL        Respiratory WDL    Respiratory WDL X;rhythm/pattern;nailbeds;cough     Rhythm/Pattern, Respiratory labored;grunting;prolonged expiratory phase     Nailbeds dusky     Cough Frequency frequent        Skin Circulation/Temperature WDL    Skin Circulation/Temperature WDL WDL        Cardiac WDL    Cardiac WDL X;all     Pulse Rate & Regularity tachycardic        Peripheral/Neurovascular WDL    Peripheral Neurovascular WDL WDL        Cognitive/Neuro/Behavioral WDL    Cognitive/Neuro/Behavioral WDL WDL                     "

## 2023-11-02 NOTE — H&P
[facility]    History and Physical - Hospitalist Service       Date of Admission:  11/2/2023    Assessment & Plan      Denilson Colunga is a 33 year old male admitted on 11/2/2023. He presented to the emergency department for evaluation of vomiting followed by coughing and shortness of breath, was found to have sepsis and acute respiratory failure with hypoxia and hypercapnia likely due to aspiration for which he is being admitted for further evaluation and treatment.     Sepsis without septic shock  Elevated lactic acid - resolved  Presented with fever, leukocytosis (WBC 15.7, ANC 13.8), tachycardia, and elevated lactic acid (2.4) - meets sepsis criteria, but was not in septic shock.   Work-up most consistent with respiratory source (see below), although patient has polysubstance abuse so bacteremia and/or endocarditis is also on the differential. Patient admits to substance abuse but says he does not inject anything, no obvious track marks, no murmur, no splinter hemorrhages.   Patient received 30 ml/kg IV fluid resuscitation with normalization of heart rate and lactic acid. Ceftriaxone and azithromycin were started in the emergency department.   - Ceftriaxone 2g and azithromycin 500 mg initiated in the emergency department 11/2/2023, continue  - Blood cultures x 2 pending; if positive would need an echo  - See below regarding respiratory work-up and management    Acute on chronic respiratory failure with hypoxia and hypercapnia  Presumed aspiration pneumonia / pneumonitis  Acute respiratory acidosis  Presented with O2 sats in the 80's on room air. Has coughing, wheezing, and shortness of breath, rhonchi and wheezing on exam. Symptoms acutely developed after patient started vomiting in his sleep, concerning for aspiration. Initial VBG shows respiratory acidosis (pH 7.30 / pCO2 59 / bicarb 29).   COVID / influenza / RSV PCR negative. Chest x-ray without acute pulmonary findings.   Most  "likely cause for respiratory failure is aspiration pneumonia / pneumonitis.   - Continue supplemental O2 to maintain sats > 92%  - On ceftriaxone / azithromycin as noted above; patient with poor dentition, could broaden to Unasyn if not improving  - Schedule DuoNebs  - No steroids given, but consider if worsening  - Repeat chest x-ray ordered after patient has increased O2 needs up to 7L - pending  - If worsening, consider diuresis given administration of large volume IV fluids    Encephalopathy   Patient intermittently lethargic and somnolent, at times speaks incoherently. When roused and awake, he is A&O x4 for the most part, but easily drifts back to sleep and can be difficult to rouse. Patient's mother says that he recently started taking Remeron and since then has been sleep walking and talking, can be difficult to rouse. She also feels he likely needs a sleep study. Patient denies any illicit substance use in the past few days. Hypercarbia has resolved on repeat VBG.  - Utox pending  - Monitor on continuous pulse ox and telemetry     Nausea and vomiting, possible aspiration  Esophageal reflux  Possible GI bleed (low concern)  Woke in the middle of the night vomiting \"dark, oily looking liquid.\" No coffee ground emesis, but unclear if there was any blood. Does have GERD at baseline but not on PPI. No alcohol, NSAID, or anticoagulation use. Admit hemoglobin 11.7.  Emesis likely contained dark bile, low suspicion for GI bleed, but cannot rule out.   Hemoglobin 11.7 ?  9.6 (although this was after 2.5L IV fluids).   - CBC in am   - IV Protonix  - If he vomits again, would send emesis for Gastroccult testing    Polysubstance abuse  Opioid dependence in remission   Admits to substance abuse, last use about 2 weeks ago, typically uses benzodiazepines. Denies any injectable recreational drugs. Takes Suboxone (8-2 mg q am, 4-1 mg noon and night) and gabapentin 800 mg tid.   - Holding Suboxone and gabapentin due to " "lethargy, resume in am if improving    Generalized anxiety disorder  Major depression in complete remission   Managed prior to admission with Buspar 30 mg bid and venlafaxine 300 mg q am.  - Continue Buspar and venlafaxine    Tobacco / nicotine dependence  Admits to vaping, requests nicotine gum.   - Nicotine gum available, change to patch if too lethargic due to aspiration risk           Diet: NPO for Medical/Clinical Reasons Except for: Meds, Ice Chips  DVT Prophylaxis: Pneumatic Compression Devices  Valdez Catheter: Not present  Lines: None     Cardiac Monitoring: ACTIVE order. Indication: Tachyarrhythmias, acute (48 hours)  Code Status: Full Code - discussed at time of admission     Clinically Significant Risk Factors Present on Admission                       # Overweight: Estimated body mass index is 27.93 kg/m  as calculated from the following:    Height as of this encounter: 1.829 m (6').    Weight as of this encounter: 93.4 kg (205 lb 14.6 oz).              Disposition Plan      Expected Discharge Date: 11/04/2023                The patient's care was discussed with the Attending Physician, Dr. Darrius Mendoza, Patient, and Patient's Family.    Randi Maier PA-C  Hospitalist Service  [facility]  Securely message with [facility] (more info)  Text page via [facility] Paging/Directory     ______________________________________________________________________    Chief Complaint   \"I woke up vomiting, then later I couldn't breathe.\"    History is obtained from the patient and his mother, review of EMR, and emergency department sign out from Dr. Itz León.    History of Present Illness   Denilson Colunga is a 33 year old male who presented to the emergency department for evaluation of emesis and difficulty breathing.     Patient was in his usual state of health yesterday and when he fell asleep last night.  Around 3 AM he woke up and immediately vomited into his garbage can.  He fell " "back asleep, but woke somewhere between 3 and 5 additional times to vomit.  Emesis was dark in color, \"looked like motor oil,\" but no coffee-ground emesis or obvious blood clots.  Around 730 this morning his mother noticed that he was having some difficulty breathing, noting he was \"hacking, coughing, and wheezing.\"  He is feeling short of breath, wheezy, has a productive cough with brown sputum.  There is no associated fevers or chills.  Around 10 AM he was in and out of sleep quite frequently, and his mother felt that he had some delirium.  His mother decided to take him to the emergency department for evaluation, where he was found to have acute respiratory failure with hypoxia and hypercarbia likely due to an aspiration event.    Patient remains intermittently encephalopathic, but is oriented when awake.  He is lethargic and in and out of sleep.  He was prescribed Remeron to help with sleep number of months ago, and ever since starting taking it he has been sleepwalking, sleep talking, and not always making sense.    Patient denies any known history of GI bleeds.  No alcohol use, NSAID use, antiplatelet, or anticoagulation use.  Melena or hematochezia.    He admits to polysubstance abuse, last use was about 2 weeks ago when he used a benzodiazepine.  He denies any injectable drug use.    Review of systems  He was having some associated palpitations earlier today.  Was feeling lightheaded today and generally weak.  The remainder review of systems is negative.        Past Medical History    Past Medical History:   Diagnosis Date    Anxiety        Past Surgical History   Past Surgical History:   Procedure Laterality Date    NO HISTORY OF SURGERY         Prior to Admission Medications   Prior to Admission Medications   Prescriptions Last Dose Informant Patient Reported? Taking?   buprenorphine HCl-naloxone HCl (SUBOXONE) 8-2 MG per film 11/2/2023 at am Self Yes Yes   Sig: Take 1 film (8-2 mg) sublingually in the " morning, then take 1/2 film (4-1 mg) sublingually in the afternoon and at bedtime.   busPIRone HCl (BUSPAR) 30 MG tablet 2023 at am Self Yes Yes   Sig: Take 1 tablet by mouth 2 times daily   gabapentin (NEURONTIN) 800 MG tablet 2023 at am Self Yes Yes   Sig: Take 1 tablet by mouth 3 times daily   ibuprofen (ADVIL/MOTRIN) 200 MG tablet Past Month at unknown Self Yes Yes   Sig: Take 2 tablets by mouth every 4 hours as needed for pain   mirtazapine (REMERON) 15 MG tablet 2023 at hs Self Yes Yes   Sig: Take 15 mg by mouth At Bedtime   naloxone (NARCAN) 4 MG/0.1ML nasal spray  Self Yes No   Sig: USE 1 SPRAY IN NOSTRIL FOR OPIATE OVERDOSE. MAY REPEAT ONCE IN 2 MINUTES.   venlafaxine (EFFEXOR XR) 150 MG 24 hr capsule 2023 at am Self Yes Yes   Sig: Take 2 capsules by mouth daily      Facility-Administered Medications: None        Review of Systems    The 10 point Review of Systems is negative other than noted in the HPI or here.     Social History   I have reviewed this patient's social history and updated it with pertinent information if needed.  Social History     Tobacco Use    Smoking status: Every Day     Packs/day: .5     Types: Cigarettes, Vaping Device     Last attempt to quit: 2010     Years since quittin.5    Smokeless tobacco: Never   Substance Use Topics    Alcohol use: Yes     Comment: socially    Drug use: Yes     Types: Marijuana     Comment: marijuana         Family History   I have reviewed this patient's family history and updated it with pertinent information if needed.  Family History   Problem Relation Age of Onset    Depression Mother     Neurologic Disorder Mother         migraine    Thyroid Disease Father     Neurologic Disorder Maternal Grandmother         migraine    Depression Maternal Grandmother     Diabetes Maternal Grandmother     Hypertension Maternal Grandmother     Heart Disease Maternal Grandfather     Heart Disease Paternal Grandmother     Cerebrovascular  Disease Paternal Grandmother     Alzheimer Disease Paternal Grandfather     Neurologic Disorder Sister         migraine        Physical Exam   Vital Signs: Temp: 99.1  F (37.3  C) Temp src: Oral BP: 117/58 Pulse: 108   Resp: 18 SpO2: 91 % O2 Device: Nasal cannula Oxygen Delivery: 7 LPM  Weight: 205 lbs 14.55 oz    Constitutional: Intermittently lethargic, wakes to voice or physical stimuli, but will fall back asleep at random with occasional jerks while asleep. Oriented and coherent when awake. No apparent distress. Ill appearing. Speaking in full coherent sentences.     Eyes: Eyes are clear, pupils are reactive. No scleral icterus.    HEENT: Oropharynx is clear and moist, no lesions. Normocephalic, no evidence of cranial trauma.      Cardiovascular: Regular rhythm, rapid rate, normal S1 and S2. No murmur, rubs, or gallops. Peripheral pulses intact bilaterally. No lower extremity edema.    Respiratory: Non-labored breathing on 3L O2 with frequent productive sounding cough. Coarse lung sounds with rhonchi and expiratory wheezes, no crackles appreciated.     GI: Soft, non-distended. Non-tender, no rebound or guarding. No hepatosplenomegaly or masses appreciated. Normal bowel sounds.     Musculoskeletal: Without obvious deformity, normal range of motion. Normal muscle bulk and tone. Distal CMS intact.      Skin: Warm and dry, no rashes or ecchymoses. No mottling of skin. No track marks or splinter hemorrhages.      Neurologic: Patient moves all extremities. Gross strength and sensation are equal bilaterally.    Genitourinary: Deferred      Medical Decision Making       75 MINUTES SPENT BY ME on the date of service doing chart review, history, exam, documentation & further activities per the note.  MANAGEMENT DISCUSSED with the following over the past 24 hours: Dr. Darrius Mendoza   NOTE(S)/MEDICAL RECORDS REVIEWED over the past 24 hours: emergency department notes  Tests ORDERED & REVIEWED in the past 24 hours:  - CMP  -  CBC  - Lactic Acid  - LFTs  - VBG  Tests personally interpreted in the past 24 hours:  - CHEST XRAY showing clear lungs bilaterally  SUPPLEMENTAL HISTORY, in addition to the patient's history, over the past 24 hours obtained from:   - Parents      Data     I have personally reviewed the following data over the past 24 hrs:    15.7 (H)  \   9.6 (L)   / 540 (H)     137 99 22.8 (H) /  105 (H)   5.0 25 1.16 \     ALT: 27 AST: 36 AP: 103 TBILI: 0.3   ALB: 5.0 TOT PROTEIN: 7.5 LIPASE: N/A     Procal: N/A CRP: N/A Lactic Acid: 1.0         Imaging results reviewed over the past 24 hrs:   Recent Results (from the past 24 hour(s))   Chest XR,  PA & LAT    Narrative    CHEST TWO VIEWS   11/2/2023 2:48 PM     HISTORY: Sepsis w/ likely pulmonary source. Concern for aspiration on  right, also with bronchospasm.    COMPARISON: 6/29/2020.      Impression    IMPRESSION: No acute cardiopulmonary disease.    ALFONSO RODRIGUEZ MD         SYSTEM ID:  [id number]   XR Chest Port 1 View    Narrative    EXAM: XR CHEST PORT 1 VIEW  LOCATION: [facility]  DATE: 11/2/2023    INDICATION: Worsening hypoxia  COMPARISON: 11/02/2023      Impression    IMPRESSION:     No focal airspace disease. No pleural effusion or pneumothorax.    The cardiomediastinal silhouette is unremarkable.

## 2023-11-02 NOTE — MEDICATION SCRIBE - ADMISSION MEDICATION HISTORY
Medication Scribe Admission Medication History    Admission medication history is complete. The information provided in this note is only as accurate as the sources available at the time of the update.    Information Source(s): Patient via in-person    Pertinent Information:     Changes made to PTA medication list:  Added: narcan  Deleted: hydroxyzine  Changed: gabapentin 600, TID to 800 mg TID    Medication Affordability:  Not including over the counter (OTC) medications, was there a time in the past 3 months when you did not take your medications as prescribed because of cost?: No    Allergies reviewed with patient and updates made in EHR: yes    Medication History Completed By: Cuca Koch 11/2/2023 4:22 PM    PTA Med List   Medication Sig Last Dose    buprenorphine HCl-naloxone HCl (SUBOXONE) 8-2 MG per film Take 1 film (8-2 mg) sublingually in the morning, then take 1/2 film (4-1 mg) sublingually in the afternoon and at bedtime. 11/2/2023 at am    busPIRone HCl (BUSPAR) 30 MG tablet Take 1 tablet by mouth 2 times daily 11/2/2023 at am    gabapentin (NEURONTIN) 800 MG tablet Take 1 tablet by mouth 3 times daily 11/2/2023 at am    ibuprofen (ADVIL/MOTRIN) 200 MG tablet Take 2 tablets by mouth every 4 hours as needed for pain Past Month at unknown    mirtazapine (REMERON) 15 MG tablet Take 15 mg by mouth At Bedtime 11/1/2023 at hs    venlafaxine (EFFEXOR XR) 150 MG 24 hr capsule Take 2 capsules by mouth daily 11/2/2023 at am

## 2023-11-03 ENCOUNTER — APPOINTMENT (OUTPATIENT)
Dept: GENERAL RADIOLOGY | Facility: CLINIC | Age: 33
End: 2023-11-03
Attending: STUDENT IN AN ORGANIZED HEALTH CARE EDUCATION/TRAINING PROGRAM
Payer: COMMERCIAL

## 2023-11-03 LAB
AMPHETAMINES UR QL SCN: ABNORMAL
ANION GAP SERPL CALCULATED.3IONS-SCNC: 9 MMOL/L (ref 7–15)
BARBITURATES UR QL SCN: ABNORMAL
BASOPHILS # BLD AUTO: 0.1 10E3/UL (ref 0–0.2)
BASOPHILS NFR BLD AUTO: 1 %
BENZODIAZ UR QL SCN: ABNORMAL
BUN SERPL-MCNC: 18.1 MG/DL (ref 6–20)
BZE UR QL SCN: ABNORMAL
CALCIUM SERPL-MCNC: 9.3 MG/DL (ref 8.6–10)
CANNABINOIDS UR QL SCN: ABNORMAL
CHLORIDE SERPL-SCNC: 101 MMOL/L (ref 98–107)
CREAT SERPL-MCNC: 0.96 MG/DL (ref 0.67–1.17)
DEPRECATED HCO3 PLAS-SCNC: 27 MMOL/L (ref 22–29)
EGFRCR SERPLBLD CKD-EPI 2021: >90 ML/MIN/1.73M2
EOSINOPHIL # BLD AUTO: 0.5 10E3/UL (ref 0–0.7)
EOSINOPHIL NFR BLD AUTO: 3 %
ERYTHROCYTE [DISTWIDTH] IN BLOOD BY AUTOMATED COUNT: 14.8 % (ref 10–15)
FENTANYL UR QL: ABNORMAL
GLUCOSE SERPL-MCNC: 106 MG/DL (ref 70–99)
HCT VFR BLD AUTO: 33.2 % (ref 40–53)
HGB BLD-MCNC: 10.4 G/DL (ref 13.3–17.7)
IMM GRANULOCYTES # BLD: 0.1 10E3/UL
IMM GRANULOCYTES NFR BLD: 0 %
LYMPHOCYTES # BLD AUTO: 1.9 10E3/UL (ref 0.8–5.3)
LYMPHOCYTES NFR BLD AUTO: 13 %
MCH RBC QN AUTO: 26 PG (ref 26.5–33)
MCHC RBC AUTO-ENTMCNC: 31.3 G/DL (ref 31.5–36.5)
MCV RBC AUTO: 83 FL (ref 78–100)
MONOCYTES # BLD AUTO: 0.9 10E3/UL (ref 0–1.3)
MONOCYTES NFR BLD AUTO: 6 %
NEUTROPHILS # BLD AUTO: 11.3 10E3/UL (ref 1.6–8.3)
NEUTROPHILS NFR BLD AUTO: 77 %
NRBC # BLD AUTO: 0 10E3/UL
NRBC BLD AUTO-RTO: 0 /100
OPIATES UR QL SCN: ABNORMAL
PCP QUAL URINE (ROCHE): ABNORMAL
PLATELET # BLD AUTO: 383 10E3/UL (ref 150–450)
POTASSIUM SERPL-SCNC: 4.3 MMOL/L (ref 3.4–5.3)
RBC # BLD AUTO: 4 10E6/UL (ref 4.4–5.9)
SODIUM SERPL-SCNC: 137 MMOL/L (ref 135–145)
WBC # BLD AUTO: 14.7 10E3/UL (ref 4–11)

## 2023-11-03 PROCEDURE — 120N000001 HC R&B MED SURG/OB

## 2023-11-03 PROCEDURE — 94640 AIRWAY INHALATION TREATMENT: CPT

## 2023-11-03 PROCEDURE — 250N000013 HC RX MED GY IP 250 OP 250 PS 637: Performed by: PHYSICIAN ASSISTANT

## 2023-11-03 PROCEDURE — 85025 COMPLETE CBC W/AUTO DIFF WBC: CPT | Performed by: PHYSICIAN ASSISTANT

## 2023-11-03 PROCEDURE — 250N000009 HC RX 250: Performed by: PHYSICIAN ASSISTANT

## 2023-11-03 PROCEDURE — 80048 BASIC METABOLIC PNL TOTAL CA: CPT | Performed by: PHYSICIAN ASSISTANT

## 2023-11-03 PROCEDURE — 80307 DRUG TEST PRSMV CHEM ANLYZR: CPT | Performed by: STUDENT IN AN ORGANIZED HEALTH CARE EDUCATION/TRAINING PROGRAM

## 2023-11-03 PROCEDURE — 250N000011 HC RX IP 250 OP 636: Performed by: STUDENT IN AN ORGANIZED HEALTH CARE EDUCATION/TRAINING PROGRAM

## 2023-11-03 PROCEDURE — 999N000157 HC STATISTIC RCP TIME EA 10 MIN

## 2023-11-03 PROCEDURE — 94640 AIRWAY INHALATION TREATMENT: CPT | Mod: 76

## 2023-11-03 PROCEDURE — 250N000013 HC RX MED GY IP 250 OP 250 PS 637: Performed by: STUDENT IN AN ORGANIZED HEALTH CARE EDUCATION/TRAINING PROGRAM

## 2023-11-03 PROCEDURE — 99233 SBSQ HOSP IP/OBS HIGH 50: CPT | Performed by: STUDENT IN AN ORGANIZED HEALTH CARE EDUCATION/TRAINING PROGRAM

## 2023-11-03 PROCEDURE — 258N000003 HC RX IP 258 OP 636: Performed by: PHYSICIAN ASSISTANT

## 2023-11-03 PROCEDURE — 71046 X-RAY EXAM CHEST 2 VIEWS: CPT

## 2023-11-03 PROCEDURE — 36415 COLL VENOUS BLD VENIPUNCTURE: CPT | Performed by: PHYSICIAN ASSISTANT

## 2023-11-03 PROCEDURE — 250N000013 HC RX MED GY IP 250 OP 250 PS 637: Performed by: HOSPITALIST

## 2023-11-03 PROCEDURE — 250N000011 HC RX IP 250 OP 636: Mod: JZ | Performed by: PHYSICIAN ASSISTANT

## 2023-11-03 PROCEDURE — C9113 INJ PANTOPRAZOLE SODIUM, VIA: HCPCS | Mod: JZ | Performed by: PHYSICIAN ASSISTANT

## 2023-11-03 RX ORDER — BENZONATATE 100 MG/1
100 CAPSULE ORAL 3 TIMES DAILY PRN
Status: DISCONTINUED | OUTPATIENT
Start: 2023-11-03 | End: 2023-11-04 | Stop reason: HOSPADM

## 2023-11-03 RX ORDER — AMPICILLIN AND SULBACTAM 2; 1 G/1; G/1
3 INJECTION, POWDER, FOR SOLUTION INTRAMUSCULAR; INTRAVENOUS EVERY 6 HOURS
Status: DISCONTINUED | OUTPATIENT
Start: 2023-11-03 | End: 2023-11-04 | Stop reason: HOSPADM

## 2023-11-03 RX ORDER — BUPRENORPHINE AND NALOXONE 8; 2 MG/1; MG/1
1 FILM, SOLUBLE BUCCAL; SUBLINGUAL 2 TIMES DAILY
Status: DISCONTINUED | OUTPATIENT
Start: 2023-11-03 | End: 2023-11-04 | Stop reason: HOSPADM

## 2023-11-03 RX ORDER — CODEINE PHOSPHATE AND GUAIFENESIN 10; 100 MG/5ML; MG/5ML
5 SOLUTION ORAL EVERY 4 HOURS PRN
Status: DISCONTINUED | OUTPATIENT
Start: 2023-11-03 | End: 2023-11-03

## 2023-11-03 RX ADMIN — BUPRENORPHINE AND NALOXONE 1 FILM: 8; 2 FILM BUCCAL; SUBLINGUAL at 20:31

## 2023-11-03 RX ADMIN — ACETAMINOPHEN 650 MG: 325 TABLET, FILM COATED ORAL at 09:33

## 2023-11-03 RX ADMIN — BUSPIRONE HYDROCHLORIDE 30 MG: 15 TABLET ORAL at 09:33

## 2023-11-03 RX ADMIN — AMPICILLIN SODIUM AND SULBACTAM SODIUM 3 G: 2; 1 INJECTION, POWDER, FOR SOLUTION INTRAMUSCULAR; INTRAVENOUS at 11:24

## 2023-11-03 RX ADMIN — AMPICILLIN SODIUM AND SULBACTAM SODIUM 3 G: 2; 1 INJECTION, POWDER, FOR SOLUTION INTRAMUSCULAR; INTRAVENOUS at 17:02

## 2023-11-03 RX ADMIN — IPRATROPIUM BROMIDE AND ALBUTEROL SULFATE 3 ML: 2.5; .5 SOLUTION RESPIRATORY (INHALATION) at 08:50

## 2023-11-03 RX ADMIN — AMPICILLIN SODIUM AND SULBACTAM SODIUM 3 G: 2; 1 INJECTION, POWDER, FOR SOLUTION INTRAMUSCULAR; INTRAVENOUS at 23:39

## 2023-11-03 RX ADMIN — PANTOPRAZOLE SODIUM 40 MG: 40 INJECTION, POWDER, LYOPHILIZED, FOR SOLUTION INTRAVENOUS at 09:34

## 2023-11-03 RX ADMIN — IPRATROPIUM BROMIDE AND ALBUTEROL SULFATE 3 ML: 2.5; .5 SOLUTION RESPIRATORY (INHALATION) at 15:57

## 2023-11-03 RX ADMIN — NICOTINE POLACRILEX 2 MG: 2 GUM, CHEWING BUCCAL at 16:38

## 2023-11-03 RX ADMIN — IPRATROPIUM BROMIDE AND ALBUTEROL SULFATE 3 ML: 2.5; .5 SOLUTION RESPIRATORY (INHALATION) at 13:14

## 2023-11-03 RX ADMIN — VENLAFAXINE HYDROCHLORIDE 300 MG: 150 CAPSULE, EXTENDED RELEASE ORAL at 09:33

## 2023-11-03 RX ADMIN — IPRATROPIUM BROMIDE AND ALBUTEROL SULFATE 3 ML: 2.5; .5 SOLUTION RESPIRATORY (INHALATION) at 19:35

## 2023-11-03 RX ADMIN — SODIUM CHLORIDE, POTASSIUM CHLORIDE, SODIUM LACTATE AND CALCIUM CHLORIDE: 600; 310; 30; 20 INJECTION, SOLUTION INTRAVENOUS at 03:05

## 2023-11-03 RX ADMIN — BUSPIRONE HYDROCHLORIDE 30 MG: 15 TABLET ORAL at 20:03

## 2023-11-03 RX ADMIN — ACETAMINOPHEN 650 MG: 325 TABLET, FILM COATED ORAL at 00:01

## 2023-11-03 ASSESSMENT — ACTIVITIES OF DAILY LIVING (ADL)
ADLS_ACUITY_SCORE: 22
ADLS_ACUITY_SCORE: 21
ADLS_ACUITY_SCORE: 22
ADLS_ACUITY_SCORE: 21
ADLS_ACUITY_SCORE: 20
ADLS_ACUITY_SCORE: 21
ADLS_ACUITY_SCORE: 22
ADLS_ACUITY_SCORE: 21

## 2023-11-03 NOTE — PROGRESS NOTES
[place] [facility] [place]    Patient admitted to room 2313 at approximately 1840 via wheel chair from emergency room. Patient was accompanied by mother.     Verbal SBAR report received from TINO Stewart prior to patient arrival.     Patient ambulated to bed with stand-by assist. Patient alert and oriented X 3. The patient is not having any pain.  . Admission vital signs: Blood pressure 117/58, pulse 91, temperature 99.1  F (37.3  C), temperature source Oral, resp. rate 18, height 1.829 m (6'), weight 93.4 kg (205 lb 14.6 oz), SpO2 91%. Patient was oriented to plan of care, call light, bed controls, tv, telephone, bathroom, and visiting hours.     Risk Assessment    The following safety risks were identified during admission: none. Yellow risk band applied: NO.     Skin Initial Assessment    This writer admitted this patient and completed a full skin assessment and Cedric score in the Adult PCS flowsheet. Appropriate interventions initiated as needed.     Patient declined a secondary skin check at this time.    Education    Patient has a [place] to Observation order: No  Observation education completed and documented: N/A      Rosanna Taylor RN

## 2023-11-03 NOTE — PROGRESS NOTES
Patient is lethargic and alert at times.  On Telemetry.  6 L O2, lung sounds are course.  Frequent harsh productive cough, tan sputum.  Patient is able to get up to bathroom with stand-by assist for help with IV pole. Voiding.  LR at 100 ml/hr.  NPO except for ice chips and meds.  No difficulty swallowing medications.  Neuro's intact.

## 2023-11-03 NOTE — PROGRESS NOTES
[facility]    Medicine Progress Note - Hospitalist Service    Date of Admission:  11/2/2023    Assessment & Plan      Denilson Colunga is a 33 year old male admitted on 11/2/2023. He presented to the emergency department for evaluation of vomiting followed by coughing and shortness of breath, was found to have sepsis and acute respiratory failure with hypoxia and hypercapnia likely due to aspiration for which he is being admitted for further evaluation and treatment.     Sepsis without septic shock  Elevated lactic acid - resolved  Presented with fever, leukocytosis (WBC 15.7, ANC 13.8), tachycardia, and elevated lactic acid (2.4) - meets sepsis criteria, but was not in septic shock.   Work-up most consistent with respiratory source (see below), although patient has polysubstance abuse so bacteremia and/or endocarditis is also on the differential. Patient admits to substance abuse but says he does not inject anything, no obvious track marks, no murmur, no splinter hemorrhages.   Patient received 30 ml/kg IV fluid resuscitation with normalization of heart rate and lactic acid. Ceftriaxone and azithromycin were started in the emergency department.   - Switched from ceftriaxone and azithromycin to Unasyn 3 g every 6   - Blood cultures with no growth to date  - See below regarding respiratory work-up and management  - Discontinued IVF, adequately resuscitated.    Acute on chronic respiratory failure with hypoxia and hypercapnia  Presumed aspiration pneumonia / pneumonitis  Acute respiratory acidosis  Presented with O2 sats in the 80's on room air. Has coughing, wheezing, and shortness of breath, rhonchi and wheezing on exam. Symptoms acutely developed after patient started vomiting in his sleep, concerning for aspiration. Initial VBG shows respiratory acidosis (pH 7.30 / pCO2 59 / bicarb 29). COVID / influenza / RSV PCR negative. Chest x-ray without acute pulmonary findings. Most likely cause  "for respiratory failure is aspiration pneumonia / pneumonitis.  Patient with Tmax 100.6 F this morning while on Rocephin and azithromycin.  - Continue supplemental O2 to maintain sats > 92%  - Switched to Unasyn as noted above; patient with poor dentition  - Schedule DuoNebs  - No steroids given, but consider if worsening  - Repeat chest x-ray ordered, results pending  - If worsening, consider diuresis given administration of large volume IV fluids  - Incentive spirometer    Encephalopathy, resolved  Patient intermittently lethargic and somnolent, at times speaks incoherently. When roused and awake, he is A&O x4 for the most part, but easily drifts back to sleep and can be difficult to rouse. Patient's mother says that he recently started taking Remeron and since then has been sleep walking and talking, can be difficult to rouse. She also feels he likely needs a sleep study. Patient denies any illicit substance use in the past few days. Hypercarbia has resolved on repeat VBG.  Likely secondary to acute illness and sepsis, resolved with treatment of underlying infection.  UDS positive for amphetamines and cannabinoids, patient denies any methamphetamine use and has a venlafaxine on his medication list which also has cross-reactivity.  - Monitor on continuous pulse ox and telemetry     Nausea and vomiting, possible aspiration  Esophageal reflux  Possible GI bleed (low concern)  Woke in the middle of the night vomiting \"dark, oily looking liquid.\" No coffee ground emesis, but unclear if there was any blood. Does have GERD at baseline but not on PPI. No alcohol, NSAID, or anticoagulation use. Admit hemoglobin 11.7.  Emesis likely contained dark bile, low suspicion for GI bleed, but cannot rule out. Hemoglobin 11.7 ?  9.6 (although this was after 2.5L IV fluids).   - Recheck CBC  - IV Protonix    Polysubstance abuse  Opioid dependence in remission   Admits to substance abuse, last use about 2 weeks ago, typically uses " benzodiazepines. Denies any injectable recreational drugs. Takes Suboxone (8-2 mg q am, 4-1 mg noon and night) and gabapentin 800 mg tid.   - Continue Suboxone and gabapentin, lethargy improved.  Continue PTA doses to avoid withdrawal.    Generalized anxiety disorder  Major depression in complete remission   Managed prior to admission with Buspar 30 mg bid and venlafaxine 300 mg q am.  - Continue Buspar and venlafaxine    Tobacco / nicotine dependence  Admits to vaping, requests nicotine gum.   - Nicotine gum available, change to patch if too lethargic due to aspiration risk           Diet: NPO for Medical/Clinical Reasons Except for: Meds, Ice Chips    DVT Prophylaxis: Low Risk/Ambulatory with no VTE prophylaxis indicated  Valdez Catheter: Not present  Lines: None     Cardiac Monitoring: ACTIVE order. Indication: Tachyarrhythmias, acute (48 hours)  Code Status: Full Code      Clinically Significant Risk Factors Present on Admission                       # Overweight: Estimated body mass index is 25.09 kg/m  as calculated from the following:    Height as of this encounter: 1.829 m (6').    Weight as of this encounter: 83.9 kg (184 lb 15.5 oz).              Disposition Plan      Expected Discharge Date: 11/04/2023                    Dillon De Leon DO  Hospitalist Service  [facility]  Securely message with [employer] (more info)  Text page via [facility] Paging/Directory   ______________________________________________________________________    Interval History   Patient continues to be hypoxic on 6-7 L NC.  Reports productive cough and loosening phlegm with improvement in overall symptoms from yesterday.  Denies any chest pain, syncope, visual changes, diarrhea, or rashes.  Reports he has been able to ambulate in the room without significant concern today.    Physical Exam   Vital Signs: Temp: 98.6  F (37  C) Temp src: Oral BP: 114/57 Pulse: 95   Resp: 16 SpO2: 91 % O2 Device: Nasal cannula Oxygen  Delivery: 6 LPM  Weight: 184 lbs 15.46 oz    Constitutional: Awake, alert, disheveled, mild distress  ENT: Poor dentition  Respiratory: Coarse breath sounds right lower/right middle lobes, expiratory wheezing left upper  Cardiovascular: Normal apical impulse, regular rate and rhythm, normal S1 and S2, no S3 or S4, and no murmur noted  GI: No scars, normal bowel sounds, soft, non-distended, non-tender, no masses palpated, no hepatosplenomegally    Medical Decision Making       55 MINUTES SPENT BY ME on the date of service doing chart review, history, exam, documentation & further activities per the note.      Data     I have personally reviewed the following data over the past 24 hrs:    14.7 (H)  \   10.4 (L)   / 383     137 101 18.1 /  106 (H)   4.3 27 0.96 \     ALT: 27 AST: 36 AP: 103 TBILI: 0.3   ALB: 5.0 TOT PROTEIN: 7.5 LIPASE: N/A     Procal: N/A CRP: N/A Lactic Acid: 1.0         Imaging results reviewed over the past 24 hrs:   Recent Results (from the past 24 hour(s))   Chest XR,  PA & LAT    Narrative    CHEST TWO VIEWS   11/2/2023 2:48 PM     HISTORY: Sepsis w/ likely pulmonary source. Concern for aspiration on  right, also with bronchospasm.    COMPARISON: 6/29/2020.      Impression    IMPRESSION: No acute cardiopulmonary disease.    ALFONSO RODRIGUEZ MD         SYSTEM ID:  [id number]   XR Chest Port 1 View    Narrative    EXAM: XR CHEST PORT 1 VIEW  LOCATION: [facility]  DATE: 11/2/2023    INDICATION: Worsening hypoxia  COMPARISON: 11/02/2023      Impression    IMPRESSION:     No focal airspace disease. No pleural effusion or pneumothorax.    The cardiomediastinal silhouette is unremarkable.

## 2023-11-03 NOTE — PLAN OF CARE
Problem: Gas Exchange Impaired  Goal: Optimal Gas Exchange  Outcome: Not Progressing     Patient is currently requiring 6 LPM O2 via nasal cannula to keep O2 sats > 92%. His sats have been in the low 90s when awake and go up into the high 90s while asleep. Lungs are coarse throughout with expiratory wheezing. He has an increasingly productive cough with tan/grey sputum. Gets IS to 1500 with coaching.    Between cares, he is lethargic and difficult to rouse. Once awake, he answers questions appropriately. Speech is slightly slurred and he is oriented x 3 (disoriented to place or time). Provider is aware of patient's neuro status and lethargy. Ambulates to the bathroom with SBA and IV pole.

## 2023-11-03 NOTE — PROGRESS NOTES
Pt A&Ox4, slightly lethargic at times. VSS, able to make needs known appropriately. Ambulating with SBA. Denies any nausea, vomiting or pain. NPO except for ice chips/small sips with meds. 02 @6L via nasal cannula. Continuous pulse ox. Tele remains on. Bed alarm on. Saline locked. Pts mother updated on plan of care at bedside this afternoon by writer.     Temp: 98.2  F (36.8  C) Temp src: Oral BP: 102/62 Pulse: 104   Resp: 16 SpO2: 96 % O2 Device: Nasal cannula Oxygen Delivery: 6 LPM

## 2023-11-04 VITALS
HEIGHT: 72 IN | RESPIRATION RATE: 16 BRPM | HEART RATE: 104 BPM | SYSTOLIC BLOOD PRESSURE: 130 MMHG | DIASTOLIC BLOOD PRESSURE: 68 MMHG | OXYGEN SATURATION: 92 % | BODY MASS INDEX: 25.05 KG/M2 | WEIGHT: 184.97 LBS | TEMPERATURE: 98.2 F

## 2023-11-04 LAB
ANION GAP SERPL CALCULATED.3IONS-SCNC: 9 MMOL/L (ref 7–15)
BUN SERPL-MCNC: 11.1 MG/DL (ref 6–20)
CALCIUM SERPL-MCNC: 8.9 MG/DL (ref 8.6–10)
CHLORIDE SERPL-SCNC: 102 MMOL/L (ref 98–107)
CREAT SERPL-MCNC: 0.87 MG/DL (ref 0.67–1.17)
DEPRECATED HCO3 PLAS-SCNC: 27 MMOL/L (ref 22–29)
EGFRCR SERPLBLD CKD-EPI 2021: >90 ML/MIN/1.73M2
ERYTHROCYTE [DISTWIDTH] IN BLOOD BY AUTOMATED COUNT: 15 % (ref 10–15)
GLUCOSE SERPL-MCNC: 107 MG/DL (ref 70–99)
HCT VFR BLD AUTO: 29.3 % (ref 40–53)
HGB BLD-MCNC: 9 G/DL (ref 13.3–17.7)
MAGNESIUM SERPL-MCNC: 1.9 MG/DL (ref 1.7–2.3)
MCH RBC QN AUTO: 25.9 PG (ref 26.5–33)
MCHC RBC AUTO-ENTMCNC: 30.7 G/DL (ref 31.5–36.5)
MCV RBC AUTO: 84 FL (ref 78–100)
PLATELET # BLD AUTO: 340 10E3/UL (ref 150–450)
POTASSIUM SERPL-SCNC: 4.4 MMOL/L (ref 3.4–5.3)
RBC # BLD AUTO: 3.48 10E6/UL (ref 4.4–5.9)
SODIUM SERPL-SCNC: 138 MMOL/L (ref 135–145)
WBC # BLD AUTO: 12 10E3/UL (ref 4–11)

## 2023-11-04 PROCEDURE — C9113 INJ PANTOPRAZOLE SODIUM, VIA: HCPCS | Mod: JZ | Performed by: PHYSICIAN ASSISTANT

## 2023-11-04 PROCEDURE — 83735 ASSAY OF MAGNESIUM: CPT | Performed by: STUDENT IN AN ORGANIZED HEALTH CARE EDUCATION/TRAINING PROGRAM

## 2023-11-04 PROCEDURE — 99239 HOSP IP/OBS DSCHRG MGMT >30: CPT | Performed by: STUDENT IN AN ORGANIZED HEALTH CARE EDUCATION/TRAINING PROGRAM

## 2023-11-04 PROCEDURE — 94640 AIRWAY INHALATION TREATMENT: CPT | Mod: 76

## 2023-11-04 PROCEDURE — 250N000013 HC RX MED GY IP 250 OP 250 PS 637: Performed by: HOSPITALIST

## 2023-11-04 PROCEDURE — 36415 COLL VENOUS BLD VENIPUNCTURE: CPT | Performed by: STUDENT IN AN ORGANIZED HEALTH CARE EDUCATION/TRAINING PROGRAM

## 2023-11-04 PROCEDURE — 80048 BASIC METABOLIC PNL TOTAL CA: CPT | Performed by: STUDENT IN AN ORGANIZED HEALTH CARE EDUCATION/TRAINING PROGRAM

## 2023-11-04 PROCEDURE — 250N000013 HC RX MED GY IP 250 OP 250 PS 637: Performed by: PHYSICIAN ASSISTANT

## 2023-11-04 PROCEDURE — 85027 COMPLETE CBC AUTOMATED: CPT | Performed by: STUDENT IN AN ORGANIZED HEALTH CARE EDUCATION/TRAINING PROGRAM

## 2023-11-04 PROCEDURE — 250N000011 HC RX IP 250 OP 636: Mod: JZ | Performed by: PHYSICIAN ASSISTANT

## 2023-11-04 PROCEDURE — 250N000011 HC RX IP 250 OP 636: Performed by: STUDENT IN AN ORGANIZED HEALTH CARE EDUCATION/TRAINING PROGRAM

## 2023-11-04 PROCEDURE — 250N000009 HC RX 250: Performed by: PHYSICIAN ASSISTANT

## 2023-11-04 RX ORDER — GUAIFENESIN 600 MG/1
1200 TABLET, EXTENDED RELEASE ORAL 2 TIMES DAILY
Qty: 20 TABLET | Refills: 0 | Status: SHIPPED | OUTPATIENT
Start: 2023-11-04 | End: 2023-11-04

## 2023-11-04 RX ORDER — DOXYCYCLINE 100 MG/1
100 CAPSULE ORAL 2 TIMES DAILY
Qty: 10 CAPSULE | Refills: 0 | Status: SHIPPED | OUTPATIENT
Start: 2023-11-04 | End: 2023-11-04

## 2023-11-04 RX ORDER — DOXYCYCLINE 100 MG/1
100 CAPSULE ORAL 2 TIMES DAILY
Qty: 10 CAPSULE | Refills: 0 | Status: SHIPPED | OUTPATIENT
Start: 2023-11-04 | End: 2023-12-08

## 2023-11-04 RX ORDER — GUAIFENESIN 600 MG/1
1200 TABLET, EXTENDED RELEASE ORAL 2 TIMES DAILY
Qty: 20 TABLET | Refills: 0 | Status: SHIPPED | OUTPATIENT
Start: 2023-11-04 | End: 2023-12-08

## 2023-11-04 RX ADMIN — IPRATROPIUM BROMIDE AND ALBUTEROL SULFATE 3 ML: 2.5; .5 SOLUTION RESPIRATORY (INHALATION) at 13:05

## 2023-11-04 RX ADMIN — BUSPIRONE HYDROCHLORIDE 30 MG: 15 TABLET ORAL at 07:49

## 2023-11-04 RX ADMIN — AMPICILLIN SODIUM AND SULBACTAM SODIUM 3 G: 2; 1 INJECTION, POWDER, FOR SOLUTION INTRAMUSCULAR; INTRAVENOUS at 11:22

## 2023-11-04 RX ADMIN — IPRATROPIUM BROMIDE AND ALBUTEROL SULFATE 3 ML: 2.5; .5 SOLUTION RESPIRATORY (INHALATION) at 07:49

## 2023-11-04 RX ADMIN — VENLAFAXINE HYDROCHLORIDE 300 MG: 150 CAPSULE, EXTENDED RELEASE ORAL at 07:49

## 2023-11-04 RX ADMIN — AMPICILLIN SODIUM AND SULBACTAM SODIUM 3 G: 2; 1 INJECTION, POWDER, FOR SOLUTION INTRAMUSCULAR; INTRAVENOUS at 05:00

## 2023-11-04 RX ADMIN — PANTOPRAZOLE SODIUM 40 MG: 40 INJECTION, POWDER, LYOPHILIZED, FOR SOLUTION INTRAVENOUS at 07:49

## 2023-11-04 RX ADMIN — BUPRENORPHINE AND NALOXONE 1 FILM: 8; 2 FILM BUCCAL; SUBLINGUAL at 07:49

## 2023-11-04 RX ADMIN — NICOTINE POLACRILEX 2 MG: 2 GUM, CHEWING BUCCAL at 08:14

## 2023-11-04 ASSESSMENT — ACTIVITIES OF DAILY LIVING (ADL)
ADLS_ACUITY_SCORE: 21

## 2023-11-04 NOTE — PROGRESS NOTES
Called by RN about pt's suboxone.    Pt is more alert and wants his suboxone and is having some withdrawal symptoms.  He states that he takes 8mg film bid (not the 8-4-4).    Changed suboxone to 8mg bid.  Did not restart neurontin.  Did not advance diet.

## 2023-11-04 NOTE — PROGRESS NOTES
"OXYGEN@ 3Lvia NC saturation 95-96%, patient tolerated well/.  \"I feel so much better.\"  OXYGEN decreased to 1L at 0645    "

## 2023-11-04 NOTE — DISCHARGE SUMMARY
[facility]  Hospitalist Discharge Summary      Date of Admission:  11/2/2023  Date of Discharge:  11/4/2023  Discharging Provider: Dillon Overton DO  Discharge Service: Hospitalist Service    Discharge Diagnoses   Sepsis without septic shock  Lactic acidosis  Acute hypoxic and hypercapnic respiratory failure  Aspiration pneumonia/pneumonitis  Encephalopathy  Esophageal reflux  Polysubstance use disorder  Opiate dependence on Suboxone  Generalized anxiety disorder  Major depression in complete remission  Tobacco/nicotine use disorder    Clinically Significant Risk Factors     # Overweight: Estimated body mass index is 25.09 kg/m  as calculated from the following:    Height as of this encounter: 1.829 m (6').    Weight as of this encounter: 83.9 kg (184 lb 15.5 oz).       Follow-ups Needed After Discharge   Follow-up Appointments     Follow-up and recommended labs and tests       Follow up with primary care provider, Physician No Ref-Primary, within 7   days to evaluate medication change and for hospital follow- up.  No follow   up labs or test are needed.          Unresulted Labs Ordered in the Past 30 Days of this Admission       Date and Time Order Name Status Description    11/2/2023  3:41 PM Blood Culture Peripheral Blood Preliminary     11/2/2023  2:07 PM Respiratory Aerobic Bacterial Culture with Gram Stain Preliminary     11/2/2023  2:07 PM Blood Culture Peripheral Blood Preliminary     11/2/2023  2:07 PM Blood Culture Peripheral Blood Preliminary         These results will be followed up by Dr. Overton.    Discharge Disposition   Discharged to home  Condition at discharge: Stable    Hospital Course   Denilson Colunga is a 33 year old male admitted on 11/2/2023. He presented to the emergency department for evaluation of vomiting followed by coughing and shortness of breath, was found to have sepsis and acute respiratory failure with hypoxia and hypercapnia likely due to aspiration for  which he is being admitted for further evaluation and treatment.     Sepsis without septic shock, resolved  Elevated lactic acid - resolved  Presented with fever, leukocytosis (WBC 15.7, ANC 13.8), tachycardia, and elevated lactic acid (2.4) - meets sepsis criteria, but was not in septic shock.   Work-up most consistent with respiratory source (see below), although patient has polysubstance abuse so bacteremia and/or endocarditis is also on the differential. Patient admits to substance abuse but says he does not inject anything, no obvious track marks, no murmur, no splinter hemorrhages.     Patient received 30 ml/kg IV fluid resuscitation with normalization of heart rate and lactic acid. Ceftriaxone and azithromycin were started in the emergency department.  Patient was switched to Unasyn 3 g every 6 hours and subsequently improved rather rapidly yesterday.  Overnight, patient was titrated down on O2 to room air and was able to maintain SPO2 greater than 90%.  Patient was still coughing the morning of discharge, but was becoming less productive.  Patient's repeat chest x-ray without lobar consolidation and was documented as atelectasis.  Patient with respiratory sputum culture positive for Staph aureus.  Low likelihood of Staph aureus pneumonia given severity of presentation and quick timeline of recovery.  - Prescribed Augmentin 875-125 mg twice daily and doxycycline 100 mg twice daily for 7 days total antibiotic duration  -We will continue to follow culture results for susceptibilities and give patient a call if antibiotics prescribed are not effective.  - Blood cultures with no growth to date  - Mucinex 1200 mg twice daily for 5 days for cough and congestion relief    Acute on chronic respiratory failure with hypoxia and hypercapnia  Presumed aspiration pneumonia / pneumonitis  Acute respiratory acidosis  Presented with O2 sats in the 80's on room air. Has coughing, wheezing, and shortness of breath, rhonchi and  "wheezing on exam. Symptoms acutely developed after patient started vomiting in his sleep, concerning for aspiration. Initial VBG shows respiratory acidosis (pH 7.30 / pCO2 59 / bicarb 29). COVID / influenza / RSV PCR negative. Chest x-ray without acute pulmonary findings. Most likely cause for respiratory failure is aspiration pneumonia / pneumonitis.  Patient with Tmax 100.6 F 11/03 while on Rocephin and azithromycin.  - See above for full plan  - Continue incentive spirometer 8-10 times an hour after being discharged    Encephalopathy, resolved  Patient intermittently lethargic and somnolent, at times speaks incoherently. When roused and awake, he is A&O x4 for the most part, but easily drifts back to sleep and can be difficult to rouse. Patient's mother says that he recently started taking Remeron and since then has been sleep walking and talking, can be difficult to rouse. She also feels he likely needs a sleep study. Patient denies any illicit substance use in the past few days. Hypercarbia has resolved on repeat VBG.  Likely secondary to acute illness and sepsis, resolved with treatment of underlying infection.  UDS positive for amphetamines and cannabinoids, patient denies any methamphetamine use and has a venlafaxine on his medication list which also has cross-reactivity.    Nausea and vomiting, possible aspiration  Esophageal reflux  Possible GI bleed (low concern)  Woke in the middle of the night vomiting \"dark, oily looking liquid.\" No coffee ground emesis, but unclear if there was any blood. Does have GERD at baseline but not on PPI. No alcohol, NSAID, or anticoagulation use. Admit hemoglobin 11.7.  Emesis likely contained dark bile, low suspicion for GI bleed, but cannot rule out. Hemoglobin 11.7 ?  9.6 (although this was after 2.5L IV fluids).     Polysubstance abuse  Opioid dependence in remission   Admits to substance abuse, last use about 2 weeks ago, typically uses benzodiazepines. Denies any " injectable recreational drugs. Takes Suboxone (8-2 mg q am, 4-1 mg noon and night) and gabapentin 800 mg tid.   - Continue Suboxone and gabapentin, lethargy improved.  Continue PTA doses to avoid withdrawal.    Generalized anxiety disorder  Major depression in complete remission   Managed prior to admission with Buspar 30 mg bid and venlafaxine 300 mg q am.  - Continue Buspar and venlafaxine    Tobacco / nicotine dependence  Admits to vaping, requests nicotine gum.   - Nicotine gum available, change to patch if too lethargic due to aspiration risk     Consultations This Hospital Stay   None    Code Status   Full Code    Time Spent on this Encounter   I, Dillon De Leon DO, personally saw the patient today and spent greater than 30 minutes discharging this patient.       Dillon De Leon DO  [facility]  [address] [facility] [address]  Phone: [phone]  Fax: [phone]  ______________________________________________________________________    Physical Exam   Vital Signs: Temp: 98.2  F (36.8  C) Temp src: Oral BP: 130/68 Pulse: 104   Resp: 16 SpO2: 92 % O2 Device: None (Room air)   Weight: 184 lbs 15.46 oz    Constitutional: awake, alert, cooperative, no apparent distress, and appears stated age  Respiratory: No increased work of breathing, good air exchange, clear to auscultation bilaterally, no crackles or wheezing and slight wheeze in JESÚS  Cardiovascular: Normal apical impulse, regular rate and rhythm, normal S1 and S2, no S3 or S4, and no murmur noted  GI: No scars, normal bowel sounds, soft, non-distended, non-tender, no masses palpated, no hepatosplenomegally  Skin: normal skin color, texture, turgor       Primary Care Physician   Physician No Ref-Primary    Discharge Orders      Reason for your hospital stay    Acute hypoxic respiratory failure secondary to aspiration pneumoniaj     Follow-up and recommended labs and tests     Follow up with primary care provider,  Physician No Ref-Primary, within 7 days to evaluate medication change and for hospital follow- up.  No follow up labs or test are needed.     Activity    Your activity upon discharge: activity as tolerated     Diet    Follow this diet upon discharge: Regular       Significant Results and Procedures   Most Recent 3 CBC's:  Recent Labs   Lab Test 11/04/23  0501 11/03/23  0612 11/02/23  1954 11/02/23  1347   WBC 12.0* 14.7*  --  15.7*   HGB 9.0* 10.4* 9.6* 11.7*   MCV 84 83  --  83    383  --  540*     Most Recent 3 BMP's:  Recent Labs   Lab Test 11/04/23  0501 11/03/23  0612 11/02/23  1347    137 137   POTASSIUM 4.4 4.3 5.0   CHLORIDE 102 101 99   CO2 27 27 25   BUN 11.1 18.1 22.8*   CR 0.87 0.96 1.16   ANIONGAP 9 9 13   NEDRA 8.9 9.3 9.8   * 106* 105*   ,   Results for orders placed or performed during the hospital encounter of 11/02/23   Chest XR,  PA & LAT    Narrative    CHEST TWO VIEWS   11/2/2023 2:48 PM     HISTORY: Sepsis w/ likely pulmonary source. Concern for aspiration on  right, also with bronchospasm.    COMPARISON: 6/29/2020.      Impression    IMPRESSION: No acute cardiopulmonary disease.    ALFONSO RODRIGUEZ MD         SYSTEM ID:  [id number]   XR Chest Port 1 View    Narrative    EXAM: XR CHEST PORT 1 VIEW  LOCATION: [facility]  DATE: 11/2/2023    INDICATION: Worsening hypoxia  COMPARISON: 11/02/2023      Impression    IMPRESSION:     No focal airspace disease. No pleural effusion or pneumothorax.    The cardiomediastinal silhouette is unremarkable.   Chest 2 Views*    Narrative    CHEST 2 VIEWS 11/3/2023 2:09 PM    HISTORY: Aspiration pneumonia.    COMPARISON: None.    FINDINGS: 11/2/2023.      Impression    IMPRESSION: Suspected atelectasis and/or scarring is seen in the lower  lobes, most notable at the cardiophrenic angles. Infiltrate is  considered somewhat less likely but difficult to entirely exclude. If  further evaluation is desired, recommend chest CT. No  signs of  pneumothorax or pleural fluid. Heart size and pulmonary vasculature  are normal appearing. No acute osseous abnormality.    RAUL ALEJANDRA MD         SYSTEM ID:  [id number]       Discharge Medications   Current Discharge Medication List        START taking these medications    Details   amoxicillin-clavulanate (AUGMENTIN) 875-125 MG tablet Take 1 tablet by mouth 2 times daily  Qty: 10 tablet, Refills: 0    Associated Diagnoses: Sepsis with acute hypoxic respiratory failure without septic shock, due to unspecified organism (H)      doxycycline hyclate (VIBRAMYCIN) 100 MG capsule Take 1 capsule (100 mg) by mouth 2 times daily for 5 days  Qty: 10 capsule, Refills: 0    Associated Diagnoses: Sepsis with acute hypoxic respiratory failure without septic shock, due to unspecified organism (H)           CONTINUE these medications which have NOT CHANGED    Details   buprenorphine HCl-naloxone HCl (SUBOXONE) 8-2 MG per film Take 1 film (8-2 mg) sublingually in the morning, then take 1/2 film (4-1 mg) sublingually in the afternoon and at bedtime.      busPIRone HCl (BUSPAR) 30 MG tablet Take 1 tablet by mouth 2 times daily      gabapentin (NEURONTIN) 800 MG tablet Take 1 tablet by mouth 3 times daily      ibuprofen (ADVIL/MOTRIN) 200 MG tablet Take 2 tablets by mouth every 4 hours as needed for pain      venlafaxine (EFFEXOR XR) 150 MG 24 hr capsule Take 2 capsules by mouth daily      naloxone (NARCAN) 4 MG/0.1ML nasal spray USE 1 SPRAY IN NOSTRIL FOR OPIATE OVERDOSE. MAY REPEAT ONCE IN 2 MINUTES.           STOP taking these medications       mirtazapine (REMERON) 15 MG tablet Comments:   Reason for Stopping:             Allergies   No Known Allergies

## 2023-11-04 NOTE — PLAN OF CARE
Problem: Gas Exchange Impaired  Goal: Optimal Gas Exchange  Outcome: Progressing     Patient is currently requiring 4 LPM O2 via nasal cannula to keep O2 sats > 92%. His sats remain in the low 90s when awake; high 90s while asleep. Lung sounds are much improved since yesterday - good air movement throughout without wheezing or crackles. Rhonchi in anterior upper lobes and laterally in lower lobes. He is still coughing up moderate quantities of tan/grey sputum. He denies shortness of breath but reports some dyspnea with activity.    Orientation and alertness have both improved since yesterday. He is oriented x 4; somnolent between cares but rouses easily. He ambulates to the bathroom with SBA. He remains NPO except for ice chips and meds. Per patient, withdrawal symptoms relieved after restarting suboxone tonight.

## 2023-11-04 NOTE — PROGRESS NOTES
[facility] DISCHARGE NOTE    Patient discharged to home at 3:08 PM via ambulation. Accompanied by mother and staff. Discharge instructions reviewed with patient, opportunity offered to ask questions. Prescriptions sent to patients preferred pharmacy. All belongings sent with patient.    Abhilash Pillai RN

## 2023-11-05 LAB
BACTERIA SPT CULT: ABNORMAL
BACTERIA SPT CULT: ABNORMAL
GRAM STAIN RESULT: ABNORMAL

## 2023-11-07 LAB
BACTERIA BLD CULT: NO GROWTH

## 2023-11-28 ENCOUNTER — APPOINTMENT (OUTPATIENT)
Dept: ULTRASOUND IMAGING | Facility: CLINIC | Age: 33
End: 2023-11-28
Attending: FAMILY MEDICINE
Payer: COMMERCIAL

## 2023-11-28 ENCOUNTER — HOSPITAL ENCOUNTER (EMERGENCY)
Facility: CLINIC | Age: 33
Discharge: HOME | End: 2023-11-28
Attending: FAMILY MEDICINE | Admitting: FAMILY MEDICINE
Payer: COMMERCIAL

## 2023-11-28 ENCOUNTER — APPOINTMENT (OUTPATIENT)
Dept: GENERAL RADIOLOGY | Facility: CLINIC | Age: 33
End: 2023-11-28
Attending: FAMILY MEDICINE
Payer: COMMERCIAL

## 2023-11-28 VITALS
BODY MASS INDEX: 25.06 KG/M2 | DIASTOLIC BLOOD PRESSURE: 60 MMHG | RESPIRATION RATE: 18 BRPM | SYSTOLIC BLOOD PRESSURE: 106 MMHG | WEIGHT: 185 LBS | HEART RATE: 95 BPM | TEMPERATURE: 96.5 F | OXYGEN SATURATION: 97 % | HEIGHT: 72 IN

## 2023-11-28 DIAGNOSIS — K21.9 GASTROESOPHAGEAL REFLUX DISEASE, UNSPECIFIED WHETHER ESOPHAGITIS PRESENT: ICD-10-CM

## 2023-11-28 DIAGNOSIS — J69.0 ASPIRATION PNEUMONIA OF LEFT UPPER LOBE, UNSPECIFIED ASPIRATION PNEUMONIA TYPE (H): ICD-10-CM

## 2023-11-28 LAB
ANION GAP SERPL CALCULATED.3IONS-SCNC: 10 MMOL/L (ref 7–15)
BASOPHILS # BLD AUTO: 0 10E3/UL (ref 0–0.2)
BASOPHILS NFR BLD AUTO: 0 %
BUN SERPL-MCNC: 16.6 MG/DL (ref 6–20)
CALCIUM SERPL-MCNC: 8.6 MG/DL (ref 8.6–10)
CHLORIDE SERPL-SCNC: 99 MMOL/L (ref 98–107)
CREAT SERPL-MCNC: 0.91 MG/DL (ref 0.67–1.17)
DEPRECATED HCO3 PLAS-SCNC: 26 MMOL/L (ref 22–29)
EGFRCR SERPLBLD CKD-EPI 2021: >90 ML/MIN/1.73M2
EOSINOPHIL # BLD AUTO: 0.7 10E3/UL (ref 0–0.7)
EOSINOPHIL NFR BLD AUTO: 7 %
ERYTHROCYTE [DISTWIDTH] IN BLOOD BY AUTOMATED COUNT: 15.3 % (ref 10–15)
FLUAV RNA SPEC QL NAA+PROBE: NEGATIVE
FLUBV RNA RESP QL NAA+PROBE: NEGATIVE
GLUCOSE SERPL-MCNC: 121 MG/DL (ref 70–99)
GROUP A STREP BY PCR: NOT DETECTED
HCT VFR BLD AUTO: 28.4 % (ref 40–53)
HGB BLD-MCNC: 8.9 G/DL (ref 13.3–17.7)
HOLD SPECIMEN: NORMAL
HOLD SPECIMEN: NORMAL
IMM GRANULOCYTES # BLD: 0 10E3/UL
IMM GRANULOCYTES NFR BLD: 0 %
LYMPHOCYTES # BLD AUTO: 1.5 10E3/UL (ref 0.8–5.3)
LYMPHOCYTES NFR BLD AUTO: 14 %
MCH RBC QN AUTO: 24.5 PG (ref 26.5–33)
MCHC RBC AUTO-ENTMCNC: 31.3 G/DL (ref 31.5–36.5)
MCV RBC AUTO: 78 FL (ref 78–100)
MONOCYTES # BLD AUTO: 0.9 10E3/UL (ref 0–1.3)
MONOCYTES NFR BLD AUTO: 8 %
NEUTROPHILS # BLD AUTO: 7.8 10E3/UL (ref 1.6–8.3)
NEUTROPHILS NFR BLD AUTO: 71 %
NRBC # BLD AUTO: 0 10E3/UL
NRBC BLD AUTO-RTO: 0 /100
PLATELET # BLD AUTO: 383 10E3/UL (ref 150–450)
POTASSIUM SERPL-SCNC: 3.9 MMOL/L (ref 3.4–5.3)
RBC # BLD AUTO: 3.63 10E6/UL (ref 4.4–5.9)
RSV RNA SPEC NAA+PROBE: NEGATIVE
SARS-COV-2 RNA RESP QL NAA+PROBE: NEGATIVE
SODIUM SERPL-SCNC: 135 MMOL/L (ref 135–145)
WBC # BLD AUTO: 11 10E3/UL (ref 4–11)

## 2023-11-28 PROCEDURE — 87637 SARSCOV2&INF A&B&RSV AMP PRB: CPT | Performed by: FAMILY MEDICINE

## 2023-11-28 PROCEDURE — 85004 AUTOMATED DIFF WBC COUNT: CPT | Performed by: FAMILY MEDICINE

## 2023-11-28 PROCEDURE — 93970 EXTREMITY STUDY: CPT

## 2023-11-28 PROCEDURE — 80051 ELECTROLYTE PANEL: CPT | Performed by: FAMILY MEDICINE

## 2023-11-28 PROCEDURE — 36415 COLL VENOUS BLD VENIPUNCTURE: CPT | Performed by: FAMILY MEDICINE

## 2023-11-28 PROCEDURE — 71046 X-RAY EXAM CHEST 2 VIEWS: CPT

## 2023-11-28 PROCEDURE — 87651 STREP A DNA AMP PROBE: CPT | Performed by: FAMILY MEDICINE

## 2023-11-28 PROCEDURE — 99284 EMERGENCY DEPT VISIT MOD MDM: CPT | Mod: 25

## 2023-11-28 PROCEDURE — 99284 EMERGENCY DEPT VISIT MOD MDM: CPT | Performed by: FAMILY MEDICINE

## 2023-11-28 ASSESSMENT — ACTIVITIES OF DAILY LIVING (ADL)
ADLS_ACUITY_SCORE: 35
ADLS_ACUITY_SCORE: 35

## 2023-11-28 NOTE — ED NOTES
Pt presents to ED with concerns of sore throat, reflux. Was recently admitted for aspiration pneumonia. Pt was discharged from the hospital about 2 weeks ago. Pt was feeling better, finished his course of antibiotics that finished over 1 week ago. This set of symptoms started yesterday upon waking and pt notes he woke with some foamy sputum next to his mouth. Pt has been having fevers intermittently. Some leg/foot swelling and pain yesterday, today the pain has resolved and the swelling is half what it was yesterday.

## 2023-11-28 NOTE — ED TRIAGE NOTES
"Pt here with multiple concerns. Pt report left foot and leg pain and swelling, better today that it has been recently. Pt also reports waking up with bile/vomit \"all over\" and is concerns for reflux and aspiration again. Pt reports sore throat and hoarse voice.      Triage Assessment (Adult)       Row Name 11/28/23 1050          Triage Assessment    Airway WDL WDL        Respiratory WDL    Respiratory WDL cough     Cough Type congested;productive        Skin Circulation/Temperature WDL    Skin Circulation/Temperature WDL WDL        Cardiac WDL    Cardiac WDL WDL        Peripheral/Neurovascular WDL    Peripheral Neurovascular WDL WDL        Cognitive/Neuro/Behavioral WDL    Cognitive/Neuro/Behavioral WDL WDL                     "

## 2023-11-28 NOTE — DISCHARGE INSTRUCTIONS
RETURN TO THE EMERGENCY ROOM FOR THE FOLLOWING:    Really worsened breathing, fainting, repeated vomiting and dehydration, concerning changes in behavior/confusion, or at anytime for any concern.    FOLLOW UP:    Swallow study orders placed.  These include an x-ray video swallow study with speech therapy referral.  Follow-up with your primary clinic as an order as well after the study is completed.  I placed a referral order, expect a phone call within the next few days to help with scheduling.    GI referral order also placed.  Expect a phone call to help with scheduling.    TREATMENT RECOMMENDATIONS:    Augmentin twice daily for 10 days.    Prilosec 20 mg twice daily x 30 days.  Maalox/Mylanta/Tums for acute pain.  Avoid caffeine, smoking, chewing tobacco, ibuprofen/advil/aleve, alcohol, eating within 5 hours of bed.    NURSE ADVICE LINE:  [phone] or [phone]

## 2023-11-28 NOTE — ED PROVIDER NOTES
HPI   Patient is a 33-year-old male presenting by private car with his mom for multiple concerns.  He was hospitalized from 11/2 until 11/4 for sepsis and aspiration pneumonia/pneumonitis.  He had improvement in the hospital while on Unasyn.  He was told to complete Augmentin and doxycycline as an outpatient which he reports doing so as directed.  He smokes.  He uses marijuana.  He has a history of polysubstance abuse though he denies any use currently.  He denies alcohol use currently.  He has a history of reflux but does not take medication for this.  There is a family history of pulmonary embolism with his dad.  No patient history of blood dyscrasias.  He reports using Suboxone, dosing morning and evening.  The doses are the same.    The patient awoke from sleep with mucus and black stuff on his shirt and bedding 2 weeks ago.  Then, mom found him yesterday with mucus on his shirt again.  The patient has a sore throat but denies chest pain or shortness of breath.  He denies coughing or congestion.  He denies fever but feels chilled and cannot get warm.  He denies upper back or flank pain.  No abdominal pain.  He denies nausea or vomiting.  He denies diarrhea, constipation, or melena/hematochezia.  He denies dysuria, urgency, frequency of urination.  He does report bilateral leg swelling, left greater than right.  He tells me that this is somewhat improved today but he still has discomfort on the left more than the right.  No trauma or injury.  No skin rash.      Allergies:  No Known Allergies  Problem List:    Patient Active Problem List    Diagnosis Date Noted    Acute on chronic respiratory failure with hypoxia and hypercapnia (H) 11/02/2023     Priority: Medium    Sepsis with acute hypoxic respiratory failure without septic shock, due to unspecified organism (H) 11/02/2023     Priority: Medium    Esophageal reflux 11/02/2023     Priority: Medium    SAVANA (generalized anxiety disorder) 06/23/2023     Priority:  Medium    Episode of recurrent major depressive disorder, unspecified depression episode severity (H24) 2023     Priority: Medium    Polysubstance abuse (H) 2023     Priority: Medium    Cannabis use disorder, severe, dependence (H) 2019     Priority: Medium    Heroin use disorder, severe (H) 2019     Priority: Medium    Major depression in complete remission (H) 2019     Priority: Medium    Acne 2011     Priority: Medium    Generalized anxiety disorder 2011     Priority: Medium     Patient currently not taking medications . Kyle 7  Is 7 today.      CARDIOVASCULAR SCREENING; LDL GOAL LESS THAN 160 10/31/2010     Priority: Medium      Past Medical History:    Past Medical History:   Diagnosis Date    Anxiety      Past Surgical History:    Past Surgical History:   Procedure Laterality Date    NO HISTORY OF SURGERY       Family History:    Family History   Problem Relation Age of Onset    Depression Mother     Neurologic Disorder Mother         migraine    Thyroid Disease Father     Neurologic Disorder Maternal Grandmother         migraine    Depression Maternal Grandmother     Diabetes Maternal Grandmother     Hypertension Maternal Grandmother     Heart Disease Maternal Grandfather     Heart Disease Paternal Grandmother     Cerebrovascular Disease Paternal Grandmother     Alzheimer Disease Paternal Grandfather     Neurologic Disorder Sister         migraine     Social History:  Marital Status:  Single [1]  Social History     Tobacco Use    Smoking status: Every Day     Packs/day: .5     Types: Cigarettes, Vaping Device     Last attempt to quit: 2010     Years since quittin.6    Smokeless tobacco: Never   Substance Use Topics    Alcohol use: Yes     Comment: socially    Drug use: Yes     Types: Marijuana     Comment: marijuana      Medications:    amoxicillin-clavulanate (AUGMENTIN) 875-125 MG tablet  omeprazole (PRILOSEC) 20 MG DR capsule  buprenorphine HCl-naloxone  HCl (SUBOXONE) 8-2 MG per film  busPIRone HCl (BUSPAR) 30 MG tablet  doxycycline hyclate (VIBRAMYCIN) 100 MG capsule  gabapentin (NEURONTIN) 800 MG tablet  guaiFENesin (MUCINEX) 600 MG 12 hr tablet  ibuprofen (ADVIL/MOTRIN) 200 MG tablet  naloxone (NARCAN) 4 MG/0.1ML nasal spray  venlafaxine (EFFEXOR XR) 150 MG 24 hr capsule      Review of Systems   All other systems reviewed and are negative.      PE   BP: (!) 101/34  Pulse: 101  Temp: (!) 96.5  F (35.8  C)  Resp: 18  Height: 182.9 cm (6')  Weight: 83.9 kg (185 lb)  SpO2: 99 %  Physical Exam  Vitals and nursing note reviewed.   Constitutional:       General: He is not in acute distress.     Comments: Somewhat disheveled/tired appearing.  Hat on, cooperative.  Answering questions well.  He has dried emesis/black appearing dried material on his left face.   HENT:      Head: Atraumatic.      Right Ear: External ear normal.      Left Ear: External ear normal.      Nose: Nose normal.      Mouth/Throat:      Mouth: Mucous membranes are dry.      Pharynx: Oropharynx is clear.   Eyes:      General: No scleral icterus.     Extraocular Movements: Extraocular movements intact.      Conjunctiva/sclera: Conjunctivae normal.      Pupils: Pupils are equal, round, and reactive to light.   Cardiovascular:      Rate and Rhythm: Tachycardia present.      Heart sounds: Normal heart sounds.   Pulmonary:      Effort: Pulmonary effort is normal. No respiratory distress.      Breath sounds: Normal breath sounds.   Abdominal:      Palpations: Abdomen is soft.      Tenderness: There is no abdominal tenderness.   Musculoskeletal:         General: Normal range of motion.      Cervical back: Normal range of motion.      Comments: Legs are nonswollen.  No tenderness.   Skin:     General: Skin is warm and dry.   Neurological:      Mental Status: He is alert and oriented to person, place, and time.   Psychiatric:         Behavior: Behavior normal.         ED COURSE and MDM   1254.  Patient has  symptoms and signs as described above.  Patient with normal vital swabs.  Multiple concerns.  Chest x-ray pending.  Ultrasound of both legs pending.  Lab values pending.  Fluid bolus.  Patient was tachycardic at triage, pulse 101.  He also had a pulse of 107-108 in the room.  Blood pressure within normal range.  No fever.  No respiratory distress and normal respiratory rate.  Oxygen saturation normal.  Low concern for PE as there is no chest pain or shortness of breath or hemoptysis or lightheadedness or fainting.    1514.  X-ray imaging shows pneumonia on the left.  Given his history I suspect aspiration pneumonia is the source.  He needs follow-up with gastroenterology, referral order placed.  I did place a referral order for swallow study as well with follow-up to his primary physician for discussion.  Augmentin prescribed.  Prilosec prescribed and risk factors to avoid discussed.  Of note, patient refusing fluid bolus in the ED.  His pulse has improved into the 90s when I am in the room, anxiety component suspected.    Electronic medical chart reviewed, including medical problems, medications, medical allergies, social history.  Recent hospitalizations and surgical procedures reviewed.  Recent clinic visits and consultations reviewed.  Recent labs and test results reviewed.  Nursing notes reviewed.    The patient, their parent if applicable, and/or their medical decision maker(s) and I have reviewed all of the available historical information, applicable PMH, physical exam findings, and objective diagnostic data gathered during this ED visit.  We then discussed all work-up options and then together agreed upon the course taken during this visit.  The ultimate disposition and plan was a cooperative decision made between myself and the patient, their parent if applicable, and/or their legal decision maker(s).  The risks and benefits of all decisions made during this visit were discussed to the best of my abilities  given the circumstances, and all parties are understanding of the pertinent ramifications of these decisions.      LABS  Labs Ordered and Resulted from Time of ED Arrival to Time of ED Departure   BASIC METABOLIC PANEL - Abnormal       Result Value    Sodium 135      Potassium 3.9      Chloride 99      Carbon Dioxide (CO2) 26      Anion Gap 10      Urea Nitrogen 16.6      Creatinine 0.91      GFR Estimate >90      Calcium 8.6      Glucose 121 (*)    CBC WITH PLATELETS AND DIFFERENTIAL - Abnormal    WBC Count 11.0      RBC Count 3.63 (*)     Hemoglobin 8.9 (*)     Hematocrit 28.4 (*)     MCV 78      MCH 24.5 (*)     MCHC 31.3 (*)     RDW 15.3 (*)     Platelet Count 383      % Neutrophils 71      % Lymphocytes 14      % Monocytes 8      % Eosinophils 7      % Basophils 0      % Immature Granulocytes 0      NRBCs per 100 WBC 0      Absolute Neutrophils 7.8      Absolute Lymphocytes 1.5      Absolute Monocytes 0.9      Absolute Eosinophils 0.7      Absolute Basophils 0.0      Absolute Immature Granulocytes 0.0      Absolute NRBCs 0.0     INFLUENZA A/B, RSV, & SARS-COV2 PCR - Normal    Influenza A PCR Negative      Influenza B PCR Negative      RSV PCR Negative      SARS CoV2 PCR Negative     GROUP A STREPTOCOCCUS PCR THROAT SWAB - Normal    Group A strep by PCR Not Detected         IMAGING  Images reviewed by me.  Radiology report also reviewed.  XR Chest 2 Views   Final Result   IMPRESSION: New and worsening left upper lobe opacities, consistent   with worsening pneumonia. No pleural effusion or pneumothorax. Normal   heart size.      BRAYDEN KHAN MD            SYSTEM ID:  [id number]      US Lower Extremity Venous Duplex Bilateral   Final Result   IMPRESSION: Negative for DVT throughout both lower extremities.      SUMAN GALVAN MD            SYSTEM ID:  [id number]      XR Video Swallow with SLP or OT - Order with Speech Therapy Referral    (Results Pending)       Procedures    Medications   sodium chloride 0.9% BOLUS  1,000 mL (1,000 mLs Intravenous Not Given 11/28/23 1326)         IMPRESSION       ICD-10-CM    1. Aspiration pneumonia of left upper lobe, unspecified aspiration pneumonia type (H)  J69.0 XR Video Swallow with SLP or OT - Order with Speech Therapy Referral     Speech Therapy Referral     Primary Care Referral     Adult GI  Referral - Consult Only      2. Gastroesophageal reflux disease, unspecified whether esophagitis present  K21.9 XR Video Swallow with SLP or OT - Order with Speech Therapy Referral     Speech Therapy Referral     Primary Care Referral     Adult GI  Referral - Consult Only               Medication List        Started      omeprazole 20 MG DR capsule  Commonly known as: PriLOSEC  20 mg, Oral, 2 TIMES DAILY                          Bandar Escudero MD  11/28/23 5828

## 2023-12-08 ENCOUNTER — OFFICE VISIT (OUTPATIENT)
Dept: FAMILY MEDICINE | Facility: CLINIC | Age: 33
End: 2023-12-08
Payer: COMMERCIAL

## 2023-12-08 VITALS
BODY MASS INDEX: 25.87 KG/M2 | OXYGEN SATURATION: 95 % | TEMPERATURE: 97.7 F | SYSTOLIC BLOOD PRESSURE: 128 MMHG | DIASTOLIC BLOOD PRESSURE: 70 MMHG | HEART RATE: 102 BPM | WEIGHT: 191 LBS | HEIGHT: 72 IN

## 2023-12-08 DIAGNOSIS — K21.9 GASTROESOPHAGEAL REFLUX DISEASE, UNSPECIFIED WHETHER ESOPHAGITIS PRESENT: ICD-10-CM

## 2023-12-08 DIAGNOSIS — J69.0 ASPIRATION PNEUMONIA OF LEFT UPPER LOBE, UNSPECIFIED ASPIRATION PNEUMONIA TYPE (H): Primary | ICD-10-CM

## 2023-12-08 DIAGNOSIS — R40.0 DAYTIME SLEEPINESS: ICD-10-CM

## 2023-12-08 PROCEDURE — 99203 OFFICE O/P NEW LOW 30 MIN: CPT | Performed by: PHYSICIAN ASSISTANT

## 2023-12-08 RX ORDER — BUSPIRONE HYDROCHLORIDE 30 MG/1
30 TABLET ORAL DAILY
COMMUNITY
Start: 2023-12-08

## 2023-12-08 ASSESSMENT — PATIENT HEALTH QUESTIONNAIRE - PHQ9
SUM OF ALL RESPONSES TO PHQ QUESTIONS 1-9: 10
SUM OF ALL RESPONSES TO PHQ QUESTIONS 1-9: 10
10. IF YOU CHECKED OFF ANY PROBLEMS, HOW DIFFICULT HAVE THESE PROBLEMS MADE IT FOR YOU TO DO YOUR WORK, TAKE CARE OF THINGS AT HOME, OR GET ALONG WITH OTHER PEOPLE: VERY DIFFICULT

## 2023-12-08 NOTE — PROGRESS NOTES
Assessment & Plan     (J69.0) Aspiration pneumonia of left upper lobe, unspecified aspiration pneumonia type (H)  (primary encounter diagnosis)  Comment: resolved/improved  Plan: last day of antibiotic today. Feeling better. No further intervention needed at this time    (K21.9) Gastroesophageal reflux disease, unspecified whether esophagitis present  Comment: refilled omeprazole as I would like him to stay on the two times daily dosing at this time until he has had a chance to follow up with GI which per his report is tentatively scheduled for later this month  Plan: omeprazole (PRILOSEC) 20 MG DR capsule            (R40.0) Daytime sleepiness  Comment: referral placed   Plan: Adult Sleep Eval & Management          Referral             MED REC REQUIRED  Post Medication Reconciliation Status: discharge medications reconciled, continue medications without change  Nicotine/Tobacco Cessation:  He reports that he has been smoking cigarettes and vaping device. He has been smoking an average of .5 packs per day. He has never used smokeless tobacco.  Nicotine/Tobacco Cessation Plan:   Information offered: Patient not interested at this time      BMI:   Estimated body mass index is 25.9 kg/m  as calculated from the following:    Height as of this encounter: 1.829 m (6').    Weight as of this encounter: 86.6 kg (191 lb).       Eleonora Pierson PA-C  [facility] LORELEI Oreilly is a 33 year old, presenting for the following health issues:  ER F/U and Establish Care        12/8/2023    10:44 AM   Additional Questions   Roomed by CORINNE Crane       [facility] [place]       ED/UC Followup:    Facility:  [facility] ER  Date of visit: 11/28/2023  Reason for visit: Pneumonia and GERD  Current Status: He said he is feeling okay.     -He is wanting to Establish Care with a new provider.    -He would like to get a referral to get a sleep study.       Stopped the remeron - worried about serotonin  syndrome  When he went to the ED he was in sort of a delusional state - he felt it was all the medications he is on    He is followed by a psychiatrist who is also managing his suboxone    Did scheduled a follow up with GI for later this month - he may need to reschedule depending on if he can get a ride or not  Stomach symptoms have been better with the omeprazole    Would like a sleep study  States he talks in his sleep and doesn't ever feel rested when he wakes up      Review of Systems   Remainder of ROS obtained and found to be negative other than that which was documented above        Objective    /70   Pulse 102   Temp 97.7  F (36.5  C) (Tympanic)   Ht 1.829 m (6')   Wt 86.6 kg (191 lb)   SpO2 95%   BMI 25.90 kg/m    Body mass index is 25.9 kg/m .  Physical Exam   GENERAL: healthy, alert and no distress  RESP: lungs clear to auscultation - no rales, rhonchi or wheezes  CV: regular rates and rhythm, normal S1 S2, no S3 or S4, and no murmur, click or rub            Answers submitted by the patient for this visit:  Patient Health Questionnaire (Submitted on 12/8/2023)  If you checked off any problems, how difficult have these problems made it for you to do your work, take care of things at home, or get along with other people?: Very difficult  PHQ9 TOTAL SCORE: 10

## 2023-12-16 NOTE — TELEPHONE ENCOUNTER
REFERRAL INFORMATION:  Referring Provider:  Dr Bandar Escudero  Referring Clinic:  [facility] ED  Reason for Visit/Diagnosis: Aspiration pneumonia of left upper lobe, Gastroesophageal reflux disease      FUTURE VISIT INFORMATION:  Appointment Date: 12/22/23  Appointment Time: 11:00am     NOTES STATUS DETAILS   OFFICE NOTE from Referring Provider Internal SEE INPATIENT NOTES   OFFICE NOTE from Other Specialist Internal Eleonora VASQUEZ @ [facility] Med:  12/8/23   HOSPITAL DISCHARGE SUMMARY/  ED VISITS Internal [facility] ED:  11/28/23 11/2/23-11/4/23   MEDICATION LIST Internal         PERTINENT LABS Internal Internal   IMAGING (CT, MRI, EGD, MRCP, Small Bowel Follow Through/SBT, MR/CT Enterography) Internal [facility]:  XR Chest 11/28/23  XR Chest 11/3/23  XR Chest 11/2/23  XR CHest 11/2/23

## 2023-12-22 ENCOUNTER — PRE VISIT (OUTPATIENT)
Dept: GASTROENTEROLOGY | Facility: CLINIC | Age: 33
End: 2023-12-22

## 2024-02-03 ENCOUNTER — HEALTH MAINTENANCE LETTER (OUTPATIENT)
Age: 34
End: 2024-02-03

## 2024-03-18 ENCOUNTER — TELEPHONE (OUTPATIENT)
Dept: SLEEP MEDICINE | Facility: CLINIC | Age: 34
End: 2024-03-18

## 2024-03-18 NOTE — TELEPHONE ENCOUNTER
Patient needs to be rescheduled for their virtual visit due to Reason for Reschedule: Patient Request    Appointment mode: Video  Provider: Domenic Armando DO Jacqueline McCaskill, Mary Ann Facilitator

## 2024-10-15 NOTE — ED NOTES
Patient was sleeping most of the time during the shift. Patient was cooperative for assessment. Patient denies all psych symptoms including pain. There was no sign of withdrawal symptoms. Patient was compliant with medication administration.    Spoke with pt and scheduled for 10/25/2024 went over instructions

## 2025-03-02 ENCOUNTER — HEALTH MAINTENANCE LETTER (OUTPATIENT)
Age: 35
End: 2025-03-02